# Patient Record
Sex: FEMALE | Race: WHITE | Employment: OTHER | URBAN - METROPOLITAN AREA
[De-identification: names, ages, dates, MRNs, and addresses within clinical notes are randomized per-mention and may not be internally consistent; named-entity substitution may affect disease eponyms.]

---

## 2017-04-12 DIAGNOSIS — Z00.00 ROUTINE GENERAL MEDICAL EXAMINATION AT A HEALTH CARE FACILITY: Primary | ICD-10-CM

## 2017-04-14 LAB
ANTIBODY: <0.1
BASOPHILS ABSOLUTE: NORMAL /ΜL
BASOPHILS RELATIVE PERCENT: NORMAL %
BUN BLDV-MCNC: 16 MG/DL
CALCIUM SERPL-MCNC: 9.1 MG/DL
CHLORIDE BLD-SCNC: 103 MMOL/L
CHOLESTEROL, TOTAL: 210 MG/DL
CHOLESTEROL/HDL RATIO: NORMAL
CO2: 25 MMOL/L
CREAT SERPL-MCNC: 0.72 MG/DL
EOSINOPHILS ABSOLUTE: NORMAL /ΜL
EOSINOPHILS RELATIVE PERCENT: NORMAL %
GFR CALCULATED: NORMAL
GLUCOSE BLD-MCNC: 76 MG/DL
HCT VFR BLD CALC: 40 % (ref 36–46)
HDLC SERPL-MCNC: 48 MG/DL (ref 35–70)
HEMOGLOBIN: 12.5 G/DL (ref 12–16)
LDL CHOLESTEROL CALCULATED: 142 MG/DL (ref 0–160)
LYMPHOCYTES ABSOLUTE: NORMAL /ΜL
LYMPHOCYTES RELATIVE PERCENT: NORMAL %
MCH RBC QN AUTO: NORMAL PG
MCHC RBC AUTO-ENTMCNC: NORMAL G/DL
MCV RBC AUTO: 94 FL
MONOCYTES ABSOLUTE: NORMAL /ΜL
MONOCYTES RELATIVE PERCENT: NORMAL %
NEUTROPHILS ABSOLUTE: NORMAL /ΜL
NEUTROPHILS RELATIVE PERCENT: NORMAL %
PLATELET # BLD: 209 K/ΜL
PMV BLD AUTO: NORMAL FL
POTASSIUM SERPL-SCNC: 4.1 MMOL/L
RBC # BLD: 4.27 10^6/ΜL
SODIUM BLD-SCNC: 144 MMOL/L
TRIGL SERPL-MCNC: 98 MG/DL
TSH SERPL DL<=0.05 MIU/L-ACNC: 2.07 UIU/ML
VLDLC SERPL CALC-MCNC: NORMAL MG/DL
WBC # BLD: 4.3 10^3/ML

## 2017-04-18 ENCOUNTER — OFFICE VISIT (OUTPATIENT)
Dept: INTERNAL MEDICINE CLINIC | Age: 62
End: 2017-04-18

## 2017-04-18 VITALS
WEIGHT: 163.8 LBS | HEIGHT: 65 IN | TEMPERATURE: 97.8 F | BODY MASS INDEX: 27.29 KG/M2 | DIASTOLIC BLOOD PRESSURE: 62 MMHG | HEART RATE: 60 BPM | SYSTOLIC BLOOD PRESSURE: 108 MMHG

## 2017-04-18 DIAGNOSIS — J06.9 UPPER RESPIRATORY TRACT INFECTION, UNSPECIFIED TYPE: ICD-10-CM

## 2017-04-18 DIAGNOSIS — M54.50 CHRONIC RIGHT-SIDED LOW BACK PAIN WITHOUT SCIATICA: ICD-10-CM

## 2017-04-18 DIAGNOSIS — R05.9 COUGH: ICD-10-CM

## 2017-04-18 DIAGNOSIS — Z00.00 PREVENTATIVE HEALTH CARE: Primary | ICD-10-CM

## 2017-04-18 DIAGNOSIS — R13.10 DYSPHAGIA, UNSPECIFIED TYPE: ICD-10-CM

## 2017-04-18 DIAGNOSIS — G89.29 CHRONIC RIGHT-SIDED LOW BACK PAIN WITHOUT SCIATICA: ICD-10-CM

## 2017-04-18 DIAGNOSIS — E78.00 HYPERCHOLESTEROLEMIA: ICD-10-CM

## 2017-04-18 DIAGNOSIS — E66.3 OVERWEIGHT (BMI 25.0-29.9): ICD-10-CM

## 2017-04-18 PROCEDURE — 99396 PREV VISIT EST AGE 40-64: CPT | Performed by: INTERNAL MEDICINE

## 2017-04-18 RX ORDER — AZITHROMYCIN 250 MG/1
TABLET, FILM COATED ORAL
Qty: 1 PACKET | Refills: 0 | Status: SHIPPED | OUTPATIENT
Start: 2017-04-18 | End: 2019-06-04 | Stop reason: SDUPTHER

## 2017-04-18 ASSESSMENT — ENCOUNTER SYMPTOMS
WHEEZING: 0
CHEST TIGHTNESS: 0
COUGH: 1
SHORTNESS OF BREATH: 0
ABDOMINAL PAIN: 0

## 2017-04-21 LAB
CLIENT CONTACT:: NORMAL
REFERENCE LAB: NORMAL

## 2017-09-19 ENCOUNTER — HOSPITAL ENCOUNTER (OUTPATIENT)
Dept: MAMMOGRAPHY | Age: 62
Discharge: HOME | End: 2017-09-19
Attending: OBSTETRICS & GYNECOLOGY | Admitting: OBSTETRICS & GYNECOLOGY

## 2017-09-19 DIAGNOSIS — Z12.31 ENCOUNTER FOR SCREENING MAMMOGRAM FOR MALIGNANT NEOPLASM OF BREAST: ICD-10-CM

## 2018-04-26 ENCOUNTER — OFFICE VISIT (OUTPATIENT)
Dept: INTERNAL MEDICINE CLINIC | Age: 63
End: 2018-04-26

## 2018-04-26 VITALS
HEART RATE: 70 BPM | HEIGHT: 65 IN | DIASTOLIC BLOOD PRESSURE: 66 MMHG | RESPIRATION RATE: 18 BRPM | TEMPERATURE: 98 F | WEIGHT: 166 LBS | BODY MASS INDEX: 27.66 KG/M2 | SYSTOLIC BLOOD PRESSURE: 106 MMHG | OXYGEN SATURATION: 95 %

## 2018-04-26 DIAGNOSIS — M79.644 PAIN OF RIGHT THUMB: Primary | ICD-10-CM

## 2018-04-26 PROCEDURE — 99213 OFFICE O/P EST LOW 20 MIN: CPT | Performed by: NURSE PRACTITIONER

## 2018-04-26 ASSESSMENT — PATIENT HEALTH QUESTIONNAIRE - PHQ9
SUM OF ALL RESPONSES TO PHQ9 QUESTIONS 1 & 2: 0
2. FEELING DOWN, DEPRESSED OR HOPELESS: 0
1. LITTLE INTEREST OR PLEASURE IN DOING THINGS: 0
SUM OF ALL RESPONSES TO PHQ QUESTIONS 1-9: 0

## 2018-09-27 ENCOUNTER — OFFICE VISIT (OUTPATIENT)
Dept: PRIMARY CARE CLINIC | Age: 63
End: 2018-09-27
Payer: COMMERCIAL

## 2018-09-27 VITALS
SYSTOLIC BLOOD PRESSURE: 124 MMHG | DIASTOLIC BLOOD PRESSURE: 74 MMHG | HEART RATE: 56 BPM | WEIGHT: 164.2 LBS | HEIGHT: 65 IN | BODY MASS INDEX: 27.36 KG/M2 | TEMPERATURE: 98.3 F

## 2018-09-27 DIAGNOSIS — Z00.00 ROUTINE GENERAL MEDICAL EXAMINATION AT A HEALTH CARE FACILITY: Primary | ICD-10-CM

## 2018-09-27 DIAGNOSIS — Z23 FLU VACCINE NEED: ICD-10-CM

## 2018-09-27 PROCEDURE — 90682 RIV4 VACC RECOMBINANT DNA IM: CPT | Performed by: INTERNAL MEDICINE

## 2018-09-27 PROCEDURE — 99396 PREV VISIT EST AGE 40-64: CPT | Performed by: INTERNAL MEDICINE

## 2018-09-27 PROCEDURE — 90471 IMMUNIZATION ADMIN: CPT | Performed by: INTERNAL MEDICINE

## 2018-09-27 NOTE — PROGRESS NOTES
normal.   Left Ear: External ear normal.   Mouth/Throat: Oropharynx is clear and moist. No oropharyngeal exudate. Eyes: Conjunctivae and EOM are normal. Pupils are equal, round, and reactive to light. Right eye exhibits no discharge. Left eye exhibits no discharge. No scleral icterus. Neck: Neck supple. No JVD present. No thyromegaly present. Cardiovascular:  Normal rate, regular rhythm, normal heart sounds and intact distal pulses. Exam reveals no gallop. No murmur heard. no edema. Chest: Effort normal and breath sounds normal. no wheezes. has no rales. Abdominal: Soft, Bowel sounds are normal.  exhibits no distension and no mass. No organomegaly  There is no tenderness. There is no guarding. Musculoskeletal: Normal range of motion in all extremities. Lymphadenopathy: No cervical adenopathy. Neurological:  exhibits normal muscle tone. No sensory or motor deficit, Coordination normal, normal reflexes. Skin: Skin is warm. No rash noted. No Erythema. Psychiatric:  alert and oriented to person, place, and time. Normal mood and affect. Immunization History   Administered Date(s) Administered    Influenza Virus Vaccine 10/23/2014    Influenza, Intradermal, Preservative free 01/31/2013    Tdap (Boostrix, Adacel) 05/01/2012    Zoster Live (Zostavax) 10/23/2014       Allergies   Allergen Reactions    Codeine      Itching     Current Outpatient Prescriptions   Medication Sig Dispense Refill    naproxen sodium (ANAPROX) 220 MG tablet Take 220 mg by mouth daily      GLUCOSAMINE HCL PO Take  by mouth.  azithromycin (ZITHROMAX Z-JANE) 250 MG tablet Take per directions 1 packet 0    Ascorbic Acid (VITAMIN C) 500 MG tablet Take 500 mg by mouth daily. Emergen C      CALCIUM PO Take  by mouth. No current facility-administered medications for this visit.         Past Medical History:   Diagnosis Date    Asthma     Asthma, exercise induced     Benign positional vertigo 7/25/2013    Sebaceous cyst Sebaceous Cyst via Neck     Past Surgical History:   Procedure Laterality Date    BREAST CYST EXCISION  1990    Benign cysts R breast    COLONOSCOPY  4/16    Dr. Pili Verma normal repeat 10 years    JOINT REPLACEMENT  torn rotator cuff bilaterally    KNEE ARTHROSCOPY  ~ 1999    L knee meniscus shaving    ROTATOR CUFF REPAIR  2006 (approx)    L shoulder     Family History   Problem Relation Age of Onset    Cancer Mother         Lung    Cancer Father         Lung    Cancer Brother         Prostate     Cancer Father         Throat     Social History     Social History    Marital status:      Spouse name: N/A    Number of children: N/A    Years of education: N/A     Occupational History    Not on file. Social History Main Topics    Smoking status: Former Smoker     Years: 15.00     Quit date: 5/20/1992    Smokeless tobacco: Never Used    Alcohol use Yes      Comment: Rare    Drug use: No    Sexual activity: Yes     Other Topics Concern    Not on file     Social History Narrative    No narrative on file       Assessment/Plan:    1. Routine general medical examination at a health care facility  - Basic Metabolic Panel; Future  - TSH without Reflex; Future  - Vitamin D 25 Hydroxy; Future  - Urinalysis with Microscopic; Future  - Lipid Panel;  Future  - CBC Auto Differential; Future      Preventive Care:  PAP: will get from ob- gyn  Vaccine:  Flu vaccine need  - INFLUENZA, QUADV, RECOMBINANT, 18 YRS AND OLDER, IM, PF, PREFILL SYR OR SDV, 0.5ML (FLUBLOK QUADV, PF)

## 2018-09-29 LAB
BASOPHILS ABSOLUTE: 0 /ΜL
BASOPHILS RELATIVE PERCENT: 0 %
BILIRUBIN, URINE: NEGATIVE
BLOOD, URINE: NEGATIVE
BUN BLDV-MCNC: 14 MG/DL
CALCIUM SERPL-MCNC: 9.1 MG/DL
CHLORIDE BLD-SCNC: 99 MMOL/L
CLARITY: CLEAR
CO2: 24 MMOL/L
COLOR: YELLOW
CREAT SERPL-MCNC: 0.76 MG/DL
EOSINOPHILS ABSOLUTE: 0.1 /ΜL
EOSINOPHILS RELATIVE PERCENT: 1 %
GFR CALCULATED: NORMAL
GLUCOSE BLD-MCNC: 72 MG/DL
GLUCOSE URINE: NORMAL
HCT VFR BLD CALC: 40.6 % (ref 36–46)
HEMOGLOBIN: 13.4 G/DL (ref 12–16)
KETONES, URINE: NEGATIVE
LEUKOCYTE ESTERASE, URINE: NORMAL
LYMPHOCYTES ABSOLUTE: 1.4 /ΜL
LYMPHOCYTES RELATIVE PERCENT: 31 %
MCH RBC QN AUTO: 30.9 PG
MCHC RBC AUTO-ENTMCNC: 33 G/DL
MCV RBC AUTO: 94 FL
MONOCYTES ABSOLUTE: 0.2 /ΜL
MONOCYTES RELATIVE PERCENT: 5 %
NEUTROPHILS ABSOLUTE: 2.7 /ΜL
NEUTROPHILS RELATIVE PERCENT: 63 %
NITRITE, URINE: NEGATIVE
PH UA: 6.5 (ref 4.5–8)
PLATELET # BLD: 213 K/ΜL
PMV BLD AUTO: NORMAL FL
POTASSIUM SERPL-SCNC: 4.1 MMOL/L
PROTEIN UA: NEGATIVE
RBC # BLD: 4.34 10^6/ΜL
SODIUM BLD-SCNC: 142 MMOL/L
SPECIFIC GRAVITY, URINE: 1.01
TSH SERPL DL<=0.05 MIU/L-ACNC: 1.45 UIU/ML
UROBILINOGEN, URINE: NORMAL
VITAMIN D 25-HYDROXY: 39.7
VITAMIN D2, 25 HYDROXY: NORMAL
VITAMIN D3,25 HYDROXY: NORMAL
WBC # BLD: 4.5 10^3/ML

## 2018-10-01 ENCOUNTER — HOSPITAL ENCOUNTER (OUTPATIENT)
Dept: WOMENS IMAGING | Age: 63
Discharge: HOME | End: 2018-10-01
Payer: COMMERCIAL

## 2018-10-01 DIAGNOSIS — Z12.31 ENCOUNTER FOR SCREENING MAMMOGRAM FOR MALIGNANT NEOPLASM OF BREAST: ICD-10-CM

## 2018-10-01 PROCEDURE — 77067 SCR MAMMO BI INCL CAD: CPT

## 2018-10-05 LAB
CLIENT CONTACT:: NORMAL
REFERENCE LAB: NORMAL

## 2018-10-10 LAB
CHOLESTEROL, TOTAL: 209 MG/DL
CHOLESTEROL/HDL RATIO: NORMAL
HDLC SERPL-MCNC: 48 MG/DL (ref 35–70)
LDL CHOLESTEROL CALCULATED: 140 MG/DL (ref 0–160)
TRIGL SERPL-MCNC: 105 MG/DL
VLDLC SERPL CALC-MCNC: NORMAL MG/DL

## 2019-06-04 ENCOUNTER — OFFICE VISIT (OUTPATIENT)
Dept: PRIMARY CARE CLINIC | Age: 64
End: 2019-06-04
Payer: MEDICARE

## 2019-06-04 VITALS
WEIGHT: 168 LBS | SYSTOLIC BLOOD PRESSURE: 130 MMHG | DIASTOLIC BLOOD PRESSURE: 66 MMHG | HEIGHT: 65 IN | OXYGEN SATURATION: 97 % | BODY MASS INDEX: 27.99 KG/M2 | TEMPERATURE: 98.4 F | HEART RATE: 62 BPM

## 2019-06-04 DIAGNOSIS — J20.9 ACUTE BRONCHITIS, UNSPECIFIED ORGANISM: Primary | ICD-10-CM

## 2019-06-04 PROCEDURE — 1036F TOBACCO NON-USER: CPT | Performed by: NURSE PRACTITIONER

## 2019-06-04 PROCEDURE — 3017F COLORECTAL CA SCREEN DOC REV: CPT | Performed by: NURSE PRACTITIONER

## 2019-06-04 PROCEDURE — G8427 DOCREV CUR MEDS BY ELIG CLIN: HCPCS | Performed by: NURSE PRACTITIONER

## 2019-06-04 PROCEDURE — G8419 CALC BMI OUT NRM PARAM NOF/U: HCPCS | Performed by: NURSE PRACTITIONER

## 2019-06-04 PROCEDURE — 99213 OFFICE O/P EST LOW 20 MIN: CPT | Performed by: NURSE PRACTITIONER

## 2019-06-04 RX ORDER — AZITHROMYCIN 250 MG/1
TABLET, FILM COATED ORAL
Qty: 6 TABLET | Refills: 0 | Status: SHIPPED | OUTPATIENT
Start: 2019-06-04 | End: 2019-10-24

## 2019-06-04 ASSESSMENT — PATIENT HEALTH QUESTIONNAIRE - PHQ9
1. LITTLE INTEREST OR PLEASURE IN DOING THINGS: 0
SUM OF ALL RESPONSES TO PHQ QUESTIONS 1-9: 0
SUM OF ALL RESPONSES TO PHQ QUESTIONS 1-9: 0
2. FEELING DOWN, DEPRESSED OR HOPELESS: 0
SUM OF ALL RESPONSES TO PHQ9 QUESTIONS 1 & 2: 0

## 2019-06-04 NOTE — PROGRESS NOTES
oriented to person, place, and time. Skin: Skin is warm and dry. Assessment:       Diagnosis Orders   1.  Acute bronchitis, unspecified organism             Plan:      Treat azithromycin   symbicort  rtc if no improvement, will need xray        JAKY Ramos - CNP

## 2019-06-05 ASSESSMENT — ENCOUNTER SYMPTOMS
COUGH: 1
SHORTNESS OF BREATH: 1

## 2019-10-08 ENCOUNTER — HOSPITAL ENCOUNTER (OUTPATIENT)
Dept: WOMENS IMAGING | Age: 64
Discharge: HOME | End: 2019-10-08
Payer: MEDICARE

## 2019-10-08 DIAGNOSIS — Z12.31 VISIT FOR SCREENING MAMMOGRAM: ICD-10-CM

## 2019-10-08 PROCEDURE — 77063 BREAST TOMOSYNTHESIS BI: CPT

## 2019-10-24 ENCOUNTER — OFFICE VISIT (OUTPATIENT)
Dept: PRIMARY CARE CLINIC | Age: 64
End: 2019-10-24
Payer: MEDICARE

## 2019-10-24 VITALS
TEMPERATURE: 98.1 F | HEIGHT: 65 IN | BODY MASS INDEX: 28.09 KG/M2 | DIASTOLIC BLOOD PRESSURE: 74 MMHG | SYSTOLIC BLOOD PRESSURE: 124 MMHG | HEART RATE: 60 BPM | WEIGHT: 168.6 LBS

## 2019-10-24 DIAGNOSIS — Z00.00 ENCOUNTER FOR MEDICARE ANNUAL WELLNESS EXAM: Primary | ICD-10-CM

## 2019-10-24 PROCEDURE — G0402 INITIAL PREVENTIVE EXAM: HCPCS | Performed by: INTERNAL MEDICINE

## 2019-10-24 PROCEDURE — 4040F PNEUMOC VAC/ADMIN/RCVD: CPT | Performed by: INTERNAL MEDICINE

## 2019-10-24 PROCEDURE — 1123F ACP DISCUSS/DSCN MKR DOCD: CPT | Performed by: INTERNAL MEDICINE

## 2019-10-24 PROCEDURE — 3017F COLORECTAL CA SCREEN DOC REV: CPT | Performed by: INTERNAL MEDICINE

## 2019-10-24 RX ORDER — NAPROXEN SODIUM 220 MG
220 TABLET ORAL 2 TIMES DAILY WITH MEALS
COMMUNITY
End: 2020-07-13 | Stop reason: ALTCHOICE

## 2019-10-24 ASSESSMENT — LIFESTYLE VARIABLES
AUDIT-C TOTAL SCORE: 1
HOW MANY STANDARD DRINKS CONTAINING ALCOHOL DO YOU HAVE ON A TYPICAL DAY: 0
HOW OFTEN DO YOU HAVE SIX OR MORE DRINKS ON ONE OCCASION: 0
HOW OFTEN DO YOU HAVE A DRINK CONTAINING ALCOHOL: 1

## 2019-10-24 ASSESSMENT — PATIENT HEALTH QUESTIONNAIRE - PHQ9
SUM OF ALL RESPONSES TO PHQ QUESTIONS 1-9: 0
SUM OF ALL RESPONSES TO PHQ QUESTIONS 1-9: 0

## 2020-06-25 ENCOUNTER — TELEPHONE (OUTPATIENT)
Dept: PRIMARY CARE CLINIC | Age: 65
End: 2020-06-25

## 2020-06-25 NOTE — TELEPHONE ENCOUNTER
I CALLED PT ABOUT HER APPT 7-13 WITH DR Pauline Ellis.     SHE IS REQUESTING TO HAVE HER FASTING LABS DONE PRIOR AT [address] SO THEY CAN BE DISCUSSED DURING HER VISIT    PLEASE LET HER KNOW WHAT  DECIDES

## 2020-07-01 NOTE — TELEPHONE ENCOUNTER
I called pt to let her know her orders were in her chart.   She will go to [place] one day next week

## 2020-07-07 DIAGNOSIS — E78.00 HYPERCHOLESTEROLEMIA: ICD-10-CM

## 2020-07-07 LAB
A/G RATIO: 2.1 (ref 1.1–2.2)
ALBUMIN SERPL-MCNC: 4.4 G/DL (ref 3.4–5)
ALP BLD-CCNC: 69 U/L (ref 40–129)
ALT SERPL-CCNC: 9 U/L (ref 10–40)
ANION GAP SERPL CALCULATED.3IONS-SCNC: 11 MMOL/L (ref 3–16)
AST SERPL-CCNC: 14 U/L (ref 15–37)
BASOPHILS ABSOLUTE: 0 K/UL (ref 0–0.2)
BASOPHILS RELATIVE PERCENT: 0.8 %
BILIRUB SERPL-MCNC: 0.3 MG/DL (ref 0–1)
BUN BLDV-MCNC: 11 MG/DL (ref 7–20)
CALCIUM SERPL-MCNC: 8.9 MG/DL (ref 8.3–10.6)
CHLORIDE BLD-SCNC: 106 MMOL/L (ref 99–110)
CHOLESTEROL, TOTAL: 204 MG/DL (ref 0–199)
CO2: 26 MMOL/L (ref 21–32)
CREAT SERPL-MCNC: 0.6 MG/DL (ref 0.6–1.2)
EOSINOPHILS ABSOLUTE: 0.1 K/UL (ref 0–0.6)
EOSINOPHILS RELATIVE PERCENT: 2.6 %
GFR AFRICAN AMERICAN: >60
GFR NON-AFRICAN AMERICAN: >60
GLOBULIN: 2.1 G/DL
GLUCOSE BLD-MCNC: 85 MG/DL (ref 70–99)
HCT VFR BLD CALC: 40.6 % (ref 36–48)
HDLC SERPL-MCNC: 44 MG/DL (ref 40–60)
HEMOGLOBIN: 13.5 G/DL (ref 12–16)
LDL CHOLESTEROL CALCULATED: 135 MG/DL
LYMPHOCYTES ABSOLUTE: 1.3 K/UL (ref 1–5.1)
LYMPHOCYTES RELATIVE PERCENT: 27.2 %
MCH RBC QN AUTO: 31.4 PG (ref 26–34)
MCHC RBC AUTO-ENTMCNC: 33.3 G/DL (ref 31–36)
MCV RBC AUTO: 94 FL (ref 80–100)
MONOCYTES ABSOLUTE: 0.3 K/UL (ref 0–1.3)
MONOCYTES RELATIVE PERCENT: 5.9 %
NEUTROPHILS ABSOLUTE: 2.9 K/UL (ref 1.7–7.7)
NEUTROPHILS RELATIVE PERCENT: 63.5 %
PDW BLD-RTO: 13.4 % (ref 12.4–15.4)
PLATELET # BLD: 166 K/UL (ref 135–450)
PMV BLD AUTO: 9.6 FL (ref 5–10.5)
POTASSIUM SERPL-SCNC: 4.3 MMOL/L (ref 3.5–5.1)
RBC # BLD: 4.31 M/UL (ref 4–5.2)
SODIUM BLD-SCNC: 143 MMOL/L (ref 136–145)
TOTAL PROTEIN: 6.5 G/DL (ref 6.4–8.2)
TRIGL SERPL-MCNC: 124 MG/DL (ref 0–150)
VLDLC SERPL CALC-MCNC: 25 MG/DL
WBC # BLD: 4.6 K/UL (ref 4–11)

## 2020-07-13 ENCOUNTER — OFFICE VISIT (OUTPATIENT)
Dept: PRIMARY CARE CLINIC | Age: 65
End: 2020-07-13
Payer: MEDICARE

## 2020-07-13 VITALS
BODY MASS INDEX: 27.32 KG/M2 | DIASTOLIC BLOOD PRESSURE: 68 MMHG | TEMPERATURE: 98.2 F | OXYGEN SATURATION: 97 % | HEIGHT: 66 IN | WEIGHT: 170 LBS | SYSTOLIC BLOOD PRESSURE: 110 MMHG | HEART RATE: 67 BPM

## 2020-07-13 PROBLEM — R13.10 DYSPHAGIA: Status: RESOLVED | Noted: 2017-04-18 | Resolved: 2020-07-13

## 2020-07-13 PROCEDURE — 99214 OFFICE O/P EST MOD 30 MIN: CPT | Performed by: FAMILY MEDICINE

## 2020-07-13 RX ORDER — GABAPENTIN 100 MG/1
100 CAPSULE ORAL NIGHTLY
Qty: 90 CAPSULE | Refills: 0 | Status: SHIPPED | OUTPATIENT
Start: 2020-07-13 | End: 2021-07-15 | Stop reason: ALTCHOICE

## 2020-07-13 ASSESSMENT — ENCOUNTER SYMPTOMS
SHORTNESS OF BREATH: 0
COUGH: 0
COLOR CHANGE: 1
ABDOMINAL PAIN: 0

## 2020-07-13 ASSESSMENT — PATIENT HEALTH QUESTIONNAIRE - PHQ9
SUM OF ALL RESPONSES TO PHQ QUESTIONS 1-9: 0
1. LITTLE INTEREST OR PLEASURE IN DOING THINGS: 0
SUM OF ALL RESPONSES TO PHQ9 QUESTIONS 1 & 2: 0
SUM OF ALL RESPONSES TO PHQ QUESTIONS 1-9: 0
2. FEELING DOWN, DEPRESSED OR HOPELESS: 0

## 2020-07-13 NOTE — PROGRESS NOTES
cysts R breast    COLONOSCOPY  4/16    Dr. Ac Salinas normal repeat 10 years    KNEE ARTHROSCOPY  ~ 1999    L knee meniscus shaving    ROTATOR CUFF REPAIR  2006 (approx)    Both shoulders        Allergies   Allergen Reactions    Codeine Itching     Itching        Family History   Problem Relation Age of Onset    Cancer Mother         Lung    Cancer Father         Lung/Throat    Cancer Brother         Prostate     Other Brother         lymphoma    Kidney Disease Sister         Patient's past medical history, surgical history, family history, medications, and allergies  were all reviewed and updated as appropriate today. Review of Systems   Constitutional: Negative for activity change, fever and unexpected weight change. Respiratory: Negative for cough and shortness of breath. Cardiovascular: Negative for chest pain. Gastrointestinal: Negative for abdominal pain. Musculoskeletal: Positive for arthralgias and joint swelling. Negative for gait problem. Skin: Positive for color change. Neurological: Negative for dizziness and weakness. Psychiatric/Behavioral: Positive for sleep disturbance. Negative for dysphoric mood. /68   Pulse 67   Temp 98.2 °F (36.8 °C) (Oral)   Ht 5' 6\" (1.676 m)   Wt 170 lb (77.1 kg)   SpO2 97%   BMI 27.44 kg/m²      Physical Exam  Vitals signs reviewed. Constitutional:       General: She is not in acute distress. Appearance: Normal appearance. She is well-developed. HENT:      Head: Normocephalic and atraumatic. Nose: Nose normal.      Mouth/Throat:      Mouth: Mucous membranes are moist.   Eyes:      General: No scleral icterus. Conjunctiva/sclera: Conjunctivae normal.      Pupils: Pupils are equal, round, and reactive to light. Neck:      Musculoskeletal: Neck supple. Thyroid: No thyromegaly. Cardiovascular:      Rate and Rhythm: Normal rate and regular rhythm. Heart sounds: No murmur.    Pulmonary:      Effort: Pulmonary diet. Monitor yearly. Return in about 1 year (around 7/13/2021) for Annual check.           [address], DO

## 2020-07-16 DIAGNOSIS — M25.50 ARTHRALGIA, UNSPECIFIED JOINT: ICD-10-CM

## 2020-07-16 DIAGNOSIS — R20.2 DISTAL PARESTHESIA: ICD-10-CM

## 2020-07-16 LAB
FOLATE: 11.04 NG/ML (ref 4.78–24.2)
RHEUMATOID FACTOR: <10 IU/ML
SEDIMENTATION RATE, ERYTHROCYTE: 10 MM/HR (ref 0–30)
VITAMIN B-12: 265 PG/ML (ref 211–911)

## 2020-07-17 LAB — ANTI-NUCLEAR ANTIBODY (ANA): NEGATIVE

## 2020-12-18 ENCOUNTER — HOSPITAL ENCOUNTER (OUTPATIENT)
Dept: WOMENS IMAGING | Age: 65
Discharge: HOME | End: 2020-12-18
Payer: MEDICARE

## 2020-12-18 PROCEDURE — 77063 BREAST TOMOSYNTHESIS BI: CPT

## 2021-07-01 ENCOUNTER — TELEPHONE (OUTPATIENT)
Dept: PRIMARY CARE CLINIC | Age: 66
End: 2021-07-01

## 2021-07-01 NOTE — TELEPHONE ENCOUNTER
Denise Pelayo, can you look at this visit please. Patient is scheduled as new, if it is for her yearly visit is should be a medicare wellness visit. Also we cannot give her a \"whooping cough vaccine\" if she wants the tetanus shot with whooping cough in it she will need to get it at a pharmacy. Medicare does not pay the doctor for these shots.

## 2021-07-01 NOTE — TELEPHONE ENCOUNTER
----- Message from Bebo Yanez sent at 7/1/2021  2:12 PM EDT -----  Subject: Message to Provider    QUESTIONS  Information for Provider? patient received a call yesterday and scheduled   appt, she sees in my chart that it is for a new patient but if its just   routine visit she does not want the appt. Would like the office to call   her and confirm why the office scheduled a new patient appt   ---------------------------------------------------------------------------  --------------  CALL BACK INFO  What is the best way for the office to contact you? OK to leave message on   voicemail  Preferred Call Back Phone Number? [phone]  ---------------------------------------------------------------------------  --------------  SCRIPT ANSWERS  Relationship to Patient?  Self

## 2021-07-12 ASSESSMENT — LIFESTYLE VARIABLES
HOW OFTEN DURING THE LAST YEAR HAVE YOU NEEDED AN ALCOHOLIC DRINK FIRST THING IN THE MORNING TO GET YOURSELF GOING AFTER A NIGHT OF HEAVY DRINKING: 0
HAVE YOU OR SOMEONE ELSE BEEN INJURED AS A RESULT OF YOUR DRINKING: 0
HOW OFTEN DURING THE LAST YEAR HAVE YOU FAILED TO DO WHAT WAS NORMALLY EXPECTED FROM YOU BECAUSE OF DRINKING: NEVER
HOW OFTEN DURING THE LAST YEAR HAVE YOU NEEDED AN ALCOHOLIC DRINK FIRST THING IN THE MORNING TO GET YOURSELF GOING AFTER A NIGHT OF HEAVY DRINKING: NEVER
HOW MANY STANDARD DRINKS CONTAINING ALCOHOL DO YOU HAVE ON A TYPICAL DAY: ONE OR TWO
HOW OFTEN DURING THE LAST YEAR HAVE YOU FAILED TO DO WHAT WAS NORMALLY EXPECTED FROM YOU BECAUSE OF DRINKING: 0
HOW OFTEN DO YOU HAVE SIX OR MORE DRINKS ON ONE OCCASION: 0
HOW OFTEN DO YOU HAVE A DRINK CONTAINING ALCOHOL: 1
HOW OFTEN DO YOU HAVE SIX OR MORE DRINKS ON ONE OCCASION: NEVER
HOW OFTEN DURING THE LAST YEAR HAVE YOU HAD A FEELING OF GUILT OR REMORSE AFTER DRINKING: 0
HAS A RELATIVE, FRIEND, DOCTOR, OR ANOTHER HEALTH PROFESSIONAL EXPRESSED CONCERN ABOUT YOUR DRINKING OR SUGGESTED YOU CUT DOWN: NO
HOW OFTEN DURING THE LAST YEAR HAVE YOU BEEN UNABLE TO REMEMBER WHAT HAPPENED THE NIGHT BEFORE BECAUSE YOU HAD BEEN DRINKING: 0
HOW OFTEN DO YOU HAVE A DRINK CONTAINING ALCOHOL: MONTHLY OR LESS
HAVE YOU OR SOMEONE ELSE BEEN INJURED AS A RESULT OF YOUR DRINKING: NO
AUDIT-C TOTAL SCORE: 0
HAS A RELATIVE, FRIEND, DOCTOR, OR ANOTHER HEALTH PROFESSIONAL EXPRESSED CONCERN ABOUT YOUR DRINKING OR SUGGESTED YOU CUT DOWN: 0
HOW OFTEN DURING THE LAST YEAR HAVE YOU FOUND THAT YOU WERE NOT ABLE TO STOP DRINKING ONCE YOU HAD STARTED: NEVER
HOW MANY STANDARD DRINKS CONTAINING ALCOHOL DO YOU HAVE ON A TYPICAL DAY: 0
HOW OFTEN DURING THE LAST YEAR HAVE YOU FOUND THAT YOU WERE NOT ABLE TO STOP DRINKING ONCE YOU HAD STARTED: 0
HOW OFTEN DURING THE LAST YEAR HAVE YOU HAD A FEELING OF GUILT OR REMORSE AFTER DRINKING: NEVER
HOW OFTEN DURING THE LAST YEAR HAVE YOU BEEN UNABLE TO REMEMBER WHAT HAPPENED THE NIGHT BEFORE BECAUSE YOU HAD BEEN DRINKING: NEVER
AUDIT TOTAL SCORE: 0
AUDIT-C TOTAL SCORE: 1
AUDIT TOTAL SCORE: 1

## 2021-07-12 ASSESSMENT — PATIENT HEALTH QUESTIONNAIRE - PHQ9
2. FEELING DOWN, DEPRESSED OR HOPELESS: 0
SUM OF ALL RESPONSES TO PHQ QUESTIONS 1-9: 0
SUM OF ALL RESPONSES TO PHQ QUESTIONS 1-9: 0
SUM OF ALL RESPONSES TO PHQ9 QUESTIONS 1 & 2: 0
1. LITTLE INTEREST OR PLEASURE IN DOING THINGS: 0
SUM OF ALL RESPONSES TO PHQ QUESTIONS 1-9: 0

## 2021-07-15 ENCOUNTER — OFFICE VISIT (OUTPATIENT)
Dept: PRIMARY CARE CLINIC | Age: 66
End: 2021-07-15
Payer: MEDICARE

## 2021-07-15 ENCOUNTER — HOSPITAL ENCOUNTER (OUTPATIENT)
Dept: GENERAL RADIOLOGY | Age: 66
Discharge: HOME | End: 2021-07-15
Payer: MEDICARE

## 2021-07-15 ENCOUNTER — HOSPITAL ENCOUNTER (OUTPATIENT)
Age: 66
Discharge: HOME | End: 2021-07-15
Payer: MEDICARE

## 2021-07-15 VITALS
SYSTOLIC BLOOD PRESSURE: 100 MMHG | HEART RATE: 57 BPM | TEMPERATURE: 98.1 F | BODY MASS INDEX: 28.06 KG/M2 | HEIGHT: 65 IN | OXYGEN SATURATION: 100 % | WEIGHT: 168.4 LBS | DIASTOLIC BLOOD PRESSURE: 62 MMHG

## 2021-07-15 DIAGNOSIS — M25.50 POLYARTHRALGIA: ICD-10-CM

## 2021-07-15 DIAGNOSIS — Z00.00 ROUTINE GENERAL MEDICAL EXAMINATION AT A HEALTH CARE FACILITY: Primary | ICD-10-CM

## 2021-07-15 DIAGNOSIS — R68.89 HEAT INTOLERANCE: ICD-10-CM

## 2021-07-15 DIAGNOSIS — E78.00 HYPERCHOLESTEROLEMIA: ICD-10-CM

## 2021-07-15 DIAGNOSIS — M54.2 NECK PAIN: ICD-10-CM

## 2021-07-15 DIAGNOSIS — Z78.0 POST-MENOPAUSAL: ICD-10-CM

## 2021-07-15 DIAGNOSIS — E53.8 B12 DEFICIENCY: ICD-10-CM

## 2021-07-15 LAB
A/G RATIO: 1.9 (ref 1.1–2.2)
ALBUMIN SERPL-MCNC: 4.4 G/DL (ref 3.4–5)
ALP BLD-CCNC: 76 U/L (ref 40–129)
ALT SERPL-CCNC: 11 U/L (ref 10–40)
ANION GAP SERPL CALCULATED.3IONS-SCNC: 11 MMOL/L (ref 3–16)
AST SERPL-CCNC: 13 U/L (ref 15–37)
BASOPHILS ABSOLUTE: 0 K/UL (ref 0–0.2)
BASOPHILS RELATIVE PERCENT: 0.9 %
BILIRUB SERPL-MCNC: 0.6 MG/DL (ref 0–1)
BUN BLDV-MCNC: 13 MG/DL (ref 7–20)
CALCIUM SERPL-MCNC: 9.1 MG/DL (ref 8.3–10.6)
CHLORIDE BLD-SCNC: 103 MMOL/L (ref 99–110)
CHOLESTEROL, TOTAL: 230 MG/DL (ref 0–199)
CO2: 28 MMOL/L (ref 21–32)
CREAT SERPL-MCNC: 0.7 MG/DL (ref 0.6–1.2)
EOSINOPHILS ABSOLUTE: 0.1 K/UL (ref 0–0.6)
EOSINOPHILS RELATIVE PERCENT: 1.5 %
FOLATE: 9.62 NG/ML (ref 4.78–24.2)
GFR AFRICAN AMERICAN: >60
GFR NON-AFRICAN AMERICAN: >60
GLOBULIN: 2.3 G/DL
GLUCOSE BLD-MCNC: 78 MG/DL (ref 70–99)
HCT VFR BLD CALC: 39.2 % (ref 36–48)
HDLC SERPL-MCNC: 45 MG/DL (ref 40–60)
HEMOGLOBIN: 13.4 G/DL (ref 12–16)
LDL CHOLESTEROL CALCULATED: 166 MG/DL
LYMPHOCYTES ABSOLUTE: 1.2 K/UL (ref 1–5.1)
LYMPHOCYTES RELATIVE PERCENT: 26.2 %
MCH RBC QN AUTO: 32 PG (ref 26–34)
MCHC RBC AUTO-ENTMCNC: 34.1 G/DL (ref 31–36)
MCV RBC AUTO: 93.8 FL (ref 80–100)
MONOCYTES ABSOLUTE: 0.3 K/UL (ref 0–1.3)
MONOCYTES RELATIVE PERCENT: 5.8 %
NEUTROPHILS ABSOLUTE: 3 K/UL (ref 1.7–7.7)
NEUTROPHILS RELATIVE PERCENT: 65.6 %
PDW BLD-RTO: 13.6 % (ref 12.4–15.4)
PLATELET # BLD: 181 K/UL (ref 135–450)
PMV BLD AUTO: 9.5 FL (ref 5–10.5)
POTASSIUM SERPL-SCNC: 4.5 MMOL/L (ref 3.5–5.1)
RBC # BLD: 4.18 M/UL (ref 4–5.2)
SODIUM BLD-SCNC: 142 MMOL/L (ref 136–145)
TOTAL PROTEIN: 6.7 G/DL (ref 6.4–8.2)
TRIGL SERPL-MCNC: 97 MG/DL (ref 0–150)
TSH REFLEX: 1.42 UIU/ML (ref 0.27–4.2)
VITAMIN B-12: 697 PG/ML (ref 211–911)
VITAMIN D 25-HYDROXY: 35.7 NG/ML
VLDLC SERPL CALC-MCNC: 19 MG/DL
WBC # BLD: 4.6 K/UL (ref 4–11)

## 2021-07-15 PROCEDURE — G8417 CALC BMI ABV UP PARAM F/U: HCPCS | Performed by: FAMILY MEDICINE

## 2021-07-15 PROCEDURE — 72040 X-RAY EXAM NECK SPINE 2-3 VW: CPT

## 2021-07-15 PROCEDURE — 1123F ACP DISCUSS/DSCN MKR DOCD: CPT | Performed by: FAMILY MEDICINE

## 2021-07-15 PROCEDURE — G8400 PT W/DXA NO RESULTS DOC: HCPCS | Performed by: FAMILY MEDICINE

## 2021-07-15 PROCEDURE — G8427 DOCREV CUR MEDS BY ELIG CLIN: HCPCS | Performed by: FAMILY MEDICINE

## 2021-07-15 PROCEDURE — 3017F COLORECTAL CA SCREEN DOC REV: CPT | Performed by: FAMILY MEDICINE

## 2021-07-15 PROCEDURE — 1090F PRES/ABSN URINE INCON ASSESS: CPT | Performed by: FAMILY MEDICINE

## 2021-07-15 PROCEDURE — 4040F PNEUMOC VAC/ADMIN/RCVD: CPT | Performed by: FAMILY MEDICINE

## 2021-07-15 PROCEDURE — G0438 PPPS, INITIAL VISIT: HCPCS | Performed by: FAMILY MEDICINE

## 2021-07-15 PROCEDURE — 99213 OFFICE O/P EST LOW 20 MIN: CPT | Performed by: FAMILY MEDICINE

## 2021-07-15 PROCEDURE — 1036F TOBACCO NON-USER: CPT | Performed by: FAMILY MEDICINE

## 2021-07-15 RX ORDER — LANOLIN ALCOHOL/MO/W.PET/CERES
1000 CREAM (GRAM) TOPICAL DAILY
COMMUNITY
End: 2022-08-18 | Stop reason: CLARIF

## 2021-07-15 NOTE — PATIENT INSTRUCTIONS
Personalized Preventive Plan for Perez Kill - 7/15/2021  Medicare offers a range of preventive health benefits. Some of the tests and screenings are paid in full while other may be subject to a deductible, co-insurance, and/or copay. Some of these benefits include a comprehensive review of your medical history including lifestyle, illnesses that may run in your family, and various assessments and screenings as appropriate. After reviewing your medical record and screening and assessments performed today your provider may have ordered immunizations, labs, imaging, and/or referrals for you. A list of these orders (if applicable) as well as your Preventive Care list are included within your After Visit Summary for your review. Other Preventive Recommendations:    · A preventive eye exam performed by an eye specialist is recommended every 1-2 years to screen for glaucoma; cataracts, macular degeneration, and other eye disorders. · A preventive dental visit is recommended every 6 months. · Try to get at least 150 minutes of exercise per week or 10,000 steps per day on a pedometer . · Order or download the FREE \"Exercise & Physical Activity: Your Everyday Guide\" from The [employer]. Call 2-143.661.2608 or search [employer] online. · You need 4997-2300 mg of calcium and 5182-1751 IU of vitamin D per day. It is possible to meet your calcium requirement with diet alone, but a vitamin D supplement is usually necessary to meet this goal.  · When exposed to the sun, use a sunscreen that protects against both UVA and UVB radiation with an SPF of 30 or greater. Reapply every 2 to 3 hours or after sweating, drying off with a towel, or swimming. · Always wear a seat belt when traveling in a car. Always wear a helmet when riding a bicycle or motorcycle.

## 2021-07-15 NOTE — PROGRESS NOTES
Medicare Annual Wellness Visit  Name: Brian Collins Date: 2021   MRN: <[id number]> Sex: Female   Age: 78 y.o. Ethnicity: Non-/Non    : 1955 Race: Chato Hicks is here for Annual Exam (fasting)    Screenings for behavioral, psychosocial and functional/safety risks, and cognitive dysfunction are all negative except as indicated below. These results, as well as other patient data from the [address] form, are documented in Flowsheets linked to this Encounter. Patient seen at last visit with c/o polyarthralgia. Inflammatory labs ordered. All normal. Hands, knee, hip, shoulders painful. Now c/o neck pain. Denies any injury. States pain radiates to both UE. Left trapezius area also painful. H/o MRI >15 years ago. Was given gabapentin at last visit, but stopped it. B12 <300 at last visit. H/o deficiency. On oral supplement daily. Cholesterol mildly elevated . Not on any treatment. Patient also c/o flushing of the face. Feels like her face will have waves of feeling very overheated, unable to cool down. Worse after working outside. Feels different than prior hot flashes. Feels particularly sensitive to the heat compared to prior. Allergies   Allergen Reactions    Codeine Itching     Itching         Prior to Visit Medications    Medication Sig Taking?  Authorizing Provider   vitamin B-12 (CYANOCOBALAMIN) 1000 MCG tablet Take 1,000 mcg by mouth daily Yes Historical Provider, MD         Past Medical History:   Diagnosis Date    Asthma, exercise induced     Benign positional vertigo 2013       Past Surgical History:   Procedure Laterality Date    BREAST CYST EXCISION      Benign cysts R breast    COLONOSCOPY      Dr. Bhandari Dys normal repeat 10 years    KNEE ARTHROSCOPY  ~     L knee meniscus shaving    ROTATOR CUFF REPAIR  2006 (approx)    Both shoulders         Family History   Problem Relation Age of Onset    Cancer Mother Lung    Cancer Father         Lung/Throat    Cancer Brother         Prostate     Other Brother         lymphoma    Kidney Disease Sister        Chiqui (Including outside providers/suppliers regularly involved in providing care):   Patient Care Team:  Ramin Lantigua DO as PCP - General (Family Medicine)  Darryle So, MD as PCP - OBGYN (Obstetrics & Gynecology)  Ramin Lantigua DO as PCP - [facility] Empaneled Provider    Wt Readings from Last 3 Encounters:   07/15/21 168 lb 6.4 oz (76.4 kg)   07/13/20 170 lb (77.1 kg)   10/24/19 168 lb 9.6 oz (76.5 kg)     Vitals:    07/15/21 0951   BP: 100/62   Cuff Size: Medium Adult   Pulse: 57   Temp: 98.1 °F (36.7 °C)   TempSrc: Temporal   SpO2: 100%   Weight: 168 lb 6.4 oz (76.4 kg)   Height: 5' 5\" (1.651 m)     Body mass index is 28.02 kg/m². Based upon direct observation of the patient, evaluation of cognition reveals recent and remote memory intact. GEN: Calm, NAD  HEENT: PERRL  CV: RRR, no murmur  Resp: CTABL  Abd: Soft, NTTP, non-distended  Ext: All 4 ext 5/5 strength, no C/C/E  Musc: + TTP left trapezius, negative spurlings, normal ROM but painful, pain noted with strength testing BL UE  Neuro: NFD    Patient's complete Health Risk Assessment and screening values have been reviewed and are found in Flowsheets. The following problems were reviewed today and where indicated follow up appointments were made and/or referrals ordered.     Positive Risk Factor Screenings with Interventions:            Hearing/Vision:  No exam data present  Hearing/Vision  Do you or your family notice any trouble with your hearing that hasn't been managed with hearing aids?: (!) Yes  Do you have difficulty driving, watching TV, or doing any of your daily activities because of your eyesight?: No  Have you had an eye exam within the past year?: Yes  ·     Safety:  Safety  Do you have working smoke detectors?: Yes  Have all throw rugs been removed or fastened?: (!) No  Do you have non-slip mats or surfaces in all bathtubs/showers?: Yes  Do all of your stairways have a railing or banister?: Yes  Are your doorways, halls and stairs free of clutter?: Yes  Do you always fasten your seatbelt when you are in a car?: Yes  Safety Interventions:  · Home safety tips provided     Personalized Preventive Plan   Current Health Maintenance Status  Immunization History   Administered Date(s) Administered    COVID-19, Sher Peter, PF, 30mcg/0.3mL 02/11/2021, 03/02/2021    Influenza Virus Vaccine 10/23/2014    Influenza, Intradermal, Preservative free 01/31/2013    Influenza, Quadv, Recombinant, IM PF (Flublok 18 yrs and older) 09/27/2018    Tdap (Boostrix, Adacel) 05/01/2012    Zoster Live (Zostavax) 10/23/2014        Health Maintenance   Topic Date Due    DEXA (modify frequency per FRAX score)  Never done    Shingles Vaccine (2 of 3) 12/18/2014    Annual Wellness Visit (AWV)  Never done    Pneumococcal 65+ years Vaccine (1 of 1 - PPSV23) Never done    Flu vaccine (1) 09/01/2021    DTaP/Tdap/Td vaccine (2 - Td or Tdap) 05/01/2022    Breast cancer screen  12/18/2022    Colon cancer screen colonoscopy  04/05/2026    Lipid screen  07/15/2026    COVID-19 Vaccine  Completed    Hepatitis C screen  Completed    Hepatitis A vaccine  Aged Out    Hepatitis B vaccine  Aged Out    Hib vaccine  Aged Out    Meningococcal (ACWY) vaccine  Aged Out     Recommendations for [employer] Due: see orders and patient instructions/AVS.  . Recommended screening schedule for the next 5-10 years is provided to the patient in written form: see Patient Nikky Sheth was seen today for annual exam.    Diagnoses and all orders for this visit:    Routine general medical examination at a health care facility    B12 deficiency  -     VITAMIN B12 & FOLATE; Future  -     CBC WITH AUTO DIFFERENTIAL;  Future    Polyarthralgia  -     VITAMIN D 25 HYDROXY; Future    Hypercholesterolemia  -     LIPID PANEL; Future  -     COMPREHENSIVE METABOLIC PANEL; Future    Heat intolerance  -     TSH with Reflex; Future    Neck pain  -     XR CERVICAL SPINE (2-3 VIEWS); Future    Post-menopausal  -     DEXA BONE DENSITY 2 SITES;  Future  -     VITAMIN D 25 HYDROXY; Future

## 2021-07-16 ENCOUNTER — TELEPHONE (OUTPATIENT)
Dept: PRIMARY CARE CLINIC | Age: 66
End: 2021-07-16

## 2021-07-16 DIAGNOSIS — M50.30 DEGENERATIVE DISC DISEASE, CERVICAL: Primary | ICD-10-CM

## 2021-07-16 NOTE — TELEPHONE ENCOUNTER
Please let patient know labs stable with the exception of cholesterol which is higher than last year. Would like for her to watch her cholesterol intake and follow AHA recommendations for heart healthy diet. Otherwise, XRAY of neck was abnormal. Significant arthritis demonstrated. With the weakness/pain in her arms, I would like to proceed with scheduling an MRI of her cervical spine. This has been ordered.

## 2021-08-03 ENCOUNTER — HOSPITAL ENCOUNTER (OUTPATIENT)
Dept: WOMENS IMAGING | Age: 66
Discharge: HOME | End: 2021-08-03
Payer: MEDICARE

## 2021-08-03 ENCOUNTER — HOSPITAL ENCOUNTER (OUTPATIENT)
Dept: MRI IMAGING | Age: 66
Discharge: HOME | End: 2021-08-03
Payer: MEDICARE

## 2021-08-03 DIAGNOSIS — Z78.0 POST-MENOPAUSAL: ICD-10-CM

## 2021-08-03 DIAGNOSIS — M50.30 DEGENERATIVE DISC DISEASE, CERVICAL: ICD-10-CM

## 2021-08-03 PROCEDURE — 72141 MRI NECK SPINE W/O DYE: CPT

## 2021-08-03 PROCEDURE — 77080 DXA BONE DENSITY AXIAL: CPT

## 2021-08-04 ENCOUNTER — TELEPHONE (OUTPATIENT)
Dept: PRIMARY CARE CLINIC | Age: 66
End: 2021-08-04

## 2021-08-06 DIAGNOSIS — M81.0 AGE-RELATED OSTEOPOROSIS WITHOUT CURRENT PATHOLOGICAL FRACTURE: ICD-10-CM

## 2021-08-06 DIAGNOSIS — M48.02 STENOSIS OF CERVICAL SPINE: Primary | ICD-10-CM

## 2021-08-06 RX ORDER — ALENDRONATE SODIUM 70 MG/1
70 TABLET ORAL
Qty: 12 TABLET | Refills: 1 | Status: SHIPPED | OUTPATIENT
Start: 2021-08-06 | End: 2022-01-13

## 2021-12-21 ENCOUNTER — HOSPITAL ENCOUNTER (OUTPATIENT)
Dept: WOMENS IMAGING | Age: 66
Discharge: HOME | End: 2021-12-21
Payer: MEDICARE

## 2021-12-21 VITALS — HEIGHT: 65 IN | WEIGHT: 168 LBS | BODY MASS INDEX: 27.99 KG/M2

## 2021-12-21 DIAGNOSIS — Z12.31 ENCOUNTER FOR SCREENING MAMMOGRAM FOR BREAST CANCER: ICD-10-CM

## 2021-12-21 PROCEDURE — 77063 BREAST TOMOSYNTHESIS BI: CPT

## 2022-01-13 DIAGNOSIS — M81.0 AGE-RELATED OSTEOPOROSIS WITHOUT CURRENT PATHOLOGICAL FRACTURE: ICD-10-CM

## 2022-01-13 RX ORDER — ALENDRONATE SODIUM 70 MG/1
TABLET ORAL
Qty: 12 TABLET | Refills: 1 | Status: SHIPPED | OUTPATIENT
Start: 2022-01-13 | End: 2022-06-27

## 2022-01-13 NOTE — TELEPHONE ENCOUNTER
Recent Visits  Date Type Provider Dept   07/15/21 Office Visit [address], DO [facility] Sheri Cook   Showing recent visits within past 540 days with a meds authorizing provider and meeting all other requirements  Future Appointments  No visits were found meeting these conditions.   Showing future appointments within next 150 days with a meds authorizing provider and meeting all other requirements

## 2022-06-27 DIAGNOSIS — M81.0 AGE-RELATED OSTEOPOROSIS WITHOUT CURRENT PATHOLOGICAL FRACTURE: ICD-10-CM

## 2022-06-28 RX ORDER — ALENDRONATE SODIUM 70 MG/1
TABLET ORAL
Qty: 12 TABLET | Refills: 0 | Status: SHIPPED | OUTPATIENT
Start: 2022-06-28 | End: 2022-09-15

## 2022-08-18 ENCOUNTER — OFFICE VISIT (OUTPATIENT)
Dept: PRIMARY CARE CLINIC | Age: 67
End: 2022-08-18
Payer: MEDICARE

## 2022-08-18 VITALS
SYSTOLIC BLOOD PRESSURE: 110 MMHG | DIASTOLIC BLOOD PRESSURE: 67 MMHG | OXYGEN SATURATION: 97 % | BODY MASS INDEX: 28.42 KG/M2 | WEIGHT: 170.6 LBS | HEIGHT: 65 IN | TEMPERATURE: 97.6 F | HEART RATE: 58 BPM

## 2022-08-18 DIAGNOSIS — R53.83 MALAISE AND FATIGUE: ICD-10-CM

## 2022-08-18 DIAGNOSIS — R53.81 MALAISE AND FATIGUE: ICD-10-CM

## 2022-08-18 DIAGNOSIS — Z00.00 MEDICARE ANNUAL WELLNESS VISIT, SUBSEQUENT: Primary | ICD-10-CM

## 2022-08-18 DIAGNOSIS — E78.00 HYPERCHOLESTEROLEMIA: ICD-10-CM

## 2022-08-18 DIAGNOSIS — H91.90 HEARING LOSS, UNSPECIFIED HEARING LOSS TYPE, UNSPECIFIED LATERALITY: ICD-10-CM

## 2022-08-18 DIAGNOSIS — E53.8 B12 DEFICIENCY: ICD-10-CM

## 2022-08-18 PROBLEM — M48.02 SPINAL STENOSIS OF CERVICAL REGION: Status: ACTIVE | Noted: 2021-11-22

## 2022-08-18 PROCEDURE — 1123F ACP DISCUSS/DSCN MKR DOCD: CPT | Performed by: FAMILY MEDICINE

## 2022-08-18 PROCEDURE — 3017F COLORECTAL CA SCREEN DOC REV: CPT | Performed by: FAMILY MEDICINE

## 2022-08-18 PROCEDURE — G0439 PPPS, SUBSEQ VISIT: HCPCS | Performed by: FAMILY MEDICINE

## 2022-08-18 SDOH — HEALTH STABILITY: PHYSICAL HEALTH: ON AVERAGE, HOW MANY DAYS PER WEEK DO YOU ENGAGE IN MODERATE TO STRENUOUS EXERCISE (LIKE A BRISK WALK)?: 4 DAYS

## 2022-08-18 SDOH — ECONOMIC STABILITY: FOOD INSECURITY: WITHIN THE PAST 12 MONTHS, YOU WORRIED THAT YOUR FOOD WOULD RUN OUT BEFORE YOU GOT MONEY TO BUY MORE.: NEVER TRUE

## 2022-08-18 SDOH — HEALTH STABILITY: PHYSICAL HEALTH: ON AVERAGE, HOW MANY MINUTES DO YOU ENGAGE IN EXERCISE AT THIS LEVEL?: 40 MIN

## 2022-08-18 SDOH — ECONOMIC STABILITY: FOOD INSECURITY: WITHIN THE PAST 12 MONTHS, THE FOOD YOU BOUGHT JUST DIDN'T LAST AND YOU DIDN'T HAVE MONEY TO GET MORE.: NEVER TRUE

## 2022-08-18 ASSESSMENT — PATIENT HEALTH QUESTIONNAIRE - PHQ9
SUM OF ALL RESPONSES TO PHQ QUESTIONS 1-9: 0
2. FEELING DOWN, DEPRESSED OR HOPELESS: 0
SUM OF ALL RESPONSES TO PHQ QUESTIONS 1-9: 0
SUM OF ALL RESPONSES TO PHQ QUESTIONS 1-9: 0
1. LITTLE INTEREST OR PLEASURE IN DOING THINGS: 0
SUM OF ALL RESPONSES TO PHQ QUESTIONS 1-9: 0
SUM OF ALL RESPONSES TO PHQ9 QUESTIONS 1 & 2: 0

## 2022-08-18 ASSESSMENT — LIFESTYLE VARIABLES
HOW MANY STANDARD DRINKS CONTAINING ALCOHOL DO YOU HAVE ON A TYPICAL DAY: 1
HOW MANY STANDARD DRINKS CONTAINING ALCOHOL DO YOU HAVE ON A TYPICAL DAY: 1 OR 2
HOW OFTEN DO YOU HAVE A DRINK CONTAINING ALCOHOL: 2
HOW OFTEN DO YOU HAVE A DRINK CONTAINING ALCOHOL: MONTHLY OR LESS
HOW OFTEN DO YOU HAVE SIX OR MORE DRINKS ON ONE OCCASION: 1

## 2022-08-18 ASSESSMENT — SOCIAL DETERMINANTS OF HEALTH (SDOH): HOW HARD IS IT FOR YOU TO PAY FOR THE VERY BASICS LIKE FOOD, HOUSING, MEDICAL CARE, AND HEATING?: NOT HARD AT ALL

## 2022-08-18 NOTE — PROGRESS NOTES
Medicare Annual Wellness Visit    Sammy Nicholas is here for Medicare AWV    Assessment & Plan   Medicare annual wellness visit, subsequent  B12 deficiency  Hypercholesterolemia  -     Comprehensive Metabolic Panel; Future  -     Lipid, Fasting; Future  Malaise and fatigue  -     Vitamin B12 & Folate; Future  -     CBC with Auto Differential; Future  Hearing loss, unspecified hearing loss type, unspecified laterality  -     Cydney Leggett Page Hanna POWELL, Audiology, [place]    Recommendations for Preventive Services Due: see orders and patient instructions/AVS.  Recommended screening schedule for the next 5-10 years is provided to the patient in written form: see Patient Instructions/AVS.     Return for Medicare Annual Wellness Visit in 1 year. Subjective     C/o fatigue, difficulty losing weight. Would like audiology referral.     Would like to stop alendronate when she can. Started 12 months ago. C/o GERD sx with it. Not interested in Prolia. Did see spine specialist. Underwent 2 BRYSON of cervical spine with good results. Patient's complete Health Risk Assessment and screening values have been reviewed and are found in Flowsheets. The following problems were reviewed today and where indicated follow up appointments were made and/or referrals ordered. Positive Risk Factor Screenings with Interventions:               Hearing/Vision:  Do you or your family notice any trouble with your hearing that hasn't been managed with hearing aids?: (!) Yes  Do you have difficulty driving, watching TV, or doing any of your daily activities because of your eyesight?: No  Have you had an eye exam within the past year?: Yes  No results found.   Hearing/Vision Interventions:  Hearing concerns:  audiology referral provided            Objective   Vitals:    08/18/22 1115   BP: 110/67   Cuff Size: Medium Adult   Pulse: 58   Temp: 97.6 °F (36.4 °C)   TempSrc: Temporal   SpO2: 97%   Weight: 170 lb 9.6 oz (77.4 kg)   Height: 5' 4.5\" (1.638 m)      Body mass index is 28.83 kg/m². Allergies   Allergen Reactions    Codeine Itching     Itching     Prior to Visit Medications    Medication Sig Taking?  Authorizing Provider   alendronate (FOSAMAX) 70 MG tablet TAKE 1 TABLET BY MOUTH ONE TIME PER WEEK Yes JAKY Servin - CNP       CareTeam (Including outside providers/suppliers regularly involved in providing care):   Patient Care Team:  Xander Luo DO as PCP - General (Family Medicine)  Selene Uribe MD as PCP - OBGYN (Obstetrics & Gynecology)  Xander Luo DO as PCP - [facility] Empaneled Provider     Reviewed and updated this visit:  Tobacco  Allergies  Meds  Med Hx  Surg Hx  Soc Hx  Fam Hx

## 2022-08-18 NOTE — PATIENT INSTRUCTIONS
[facility] Locations - No appointment necessary. @ indicates the location is open Saturdays in addition to Monday through Friday. Call your preferred location for test preparation, business hours and other information you need. [employer] accepts [employer]. [facility]    @ [place] [facility] [address] Amrit Sharma. [address]   Ph: [phone] [facility] Sv[facility]. [address]   Ph: [phone]  @ [facility] S[facility]. [address] [facility], [facility]   Ph: [phone]    [facility] Sv[facility]. [address] greg[address]   Ph: [phone] @ [place] [facility]. [address] [facility], [facility]  Ph: [phone] @ [place] [facility]. [address] [place] [address]. [address]   Ph: [phone]     [facility] Sv. [facility] Aditi [address]  Ph: [phone]    [place]   @ [place] [facility] Svcs. [address] [address]   Ph: [phone] [facility]. Office [facility]. [address] [place] [address]  Ph: [address]:  [address] Lab Svcs[address] [facility]   Ph: [phone] [facility]. Lab Svcs. [address] [address]   Ph: [phone]       Personalized Preventive Plan for Janel Pacheco - 8/18/2022  Medicare offers a range of preventive health benefits. Some of the tests and screenings are paid in full while other may be subject to a deductible, co-insurance, and/or copay. Some of these benefits include a comprehensive review of your medical history including lifestyle, illnesses that may run in your family, and various assessments and screenings as appropriate.     After reviewing your medical record and screening and assessments performed today your provider may have ordered immunizations, labs, imaging, and/or referrals for you. A list of these orders (if applicable) as well as your Preventive Care list are included within your After Visit Summary for your review. Other Preventive Recommendations:    A preventive eye exam performed by an eye specialist is recommended every 1-2 years to screen for glaucoma; cataracts, macular degeneration, and other eye disorders. A preventive dental visit is recommended every 6 months. Try to get at least 150 minutes of exercise per week or 10,000 steps per day on a pedometer . Order or download the FREE \"Exercise & Physical Activity: Your Everyday Guide\" from [employer]. Call 2-672.455.9742 or search [employer] online. You need 8809-7319 mg of calcium and 0844-8852 IU of vitamin D per day. It is possible to meet your calcium requirement with diet alone, but a vitamin D supplement is usually necessary to meet this goal.  When exposed to the sun, use a sunscreen that protects against both UVA and UVB radiation with an SPF of 30 or greater. Reapply every 2 to 3 hours or after sweating, drying off with a towel, or swimming. Always wear a seat belt when traveling in a car. Always wear a helmet when riding a bicycle or motorcycle.

## 2022-08-19 DIAGNOSIS — E78.00 HYPERCHOLESTEROLEMIA: ICD-10-CM

## 2022-08-19 DIAGNOSIS — R53.81 MALAISE AND FATIGUE: ICD-10-CM

## 2022-08-19 DIAGNOSIS — R53.83 MALAISE AND FATIGUE: ICD-10-CM

## 2022-08-19 LAB
A/G RATIO: 2.4 (ref 1.1–2.2)
ALBUMIN SERPL-MCNC: 4.5 G/DL (ref 3.4–5)
ALP BLD-CCNC: 67 U/L (ref 40–129)
ALT SERPL-CCNC: 13 U/L (ref 10–40)
ANION GAP SERPL CALCULATED.3IONS-SCNC: 11 MMOL/L (ref 3–16)
AST SERPL-CCNC: 14 U/L (ref 15–37)
BASOPHILS ABSOLUTE: 0.1 K/UL (ref 0–0.2)
BASOPHILS RELATIVE PERCENT: 1.4 %
BILIRUB SERPL-MCNC: 0.5 MG/DL (ref 0–1)
BUN BLDV-MCNC: 16 MG/DL (ref 7–20)
CALCIUM SERPL-MCNC: 9 MG/DL (ref 8.3–10.6)
CHLORIDE BLD-SCNC: 107 MMOL/L (ref 99–110)
CHOLESTEROL, FASTING: 217 MG/DL (ref 0–199)
CO2: 27 MMOL/L (ref 21–32)
CREAT SERPL-MCNC: 0.8 MG/DL (ref 0.6–1.2)
EOSINOPHILS ABSOLUTE: 0.1 K/UL (ref 0–0.6)
EOSINOPHILS RELATIVE PERCENT: 3.6 %
FOLATE: 7.96 NG/ML (ref 4.78–24.2)
GFR AFRICAN AMERICAN: >60
GFR NON-AFRICAN AMERICAN: >60
GLUCOSE BLD-MCNC: 76 MG/DL (ref 70–99)
HCT VFR BLD CALC: 39.2 % (ref 36–48)
HDLC SERPL-MCNC: 45 MG/DL (ref 40–60)
HEMOGLOBIN: 13.2 G/DL (ref 12–16)
LDL CHOLESTEROL CALCULATED: 148 MG/DL
LYMPHOCYTES ABSOLUTE: 1.4 K/UL (ref 1–5.1)
LYMPHOCYTES RELATIVE PERCENT: 33 %
MCH RBC QN AUTO: 31.6 PG (ref 26–34)
MCHC RBC AUTO-ENTMCNC: 33.5 G/DL (ref 31–36)
MCV RBC AUTO: 94.1 FL (ref 80–100)
MONOCYTES ABSOLUTE: 0.3 K/UL (ref 0–1.3)
MONOCYTES RELATIVE PERCENT: 7.3 %
NEUTROPHILS ABSOLUTE: 2.3 K/UL (ref 1.7–7.7)
NEUTROPHILS RELATIVE PERCENT: 54.7 %
PDW BLD-RTO: 13.7 % (ref 12.4–15.4)
PLATELET # BLD: 175 K/UL (ref 135–450)
PMV BLD AUTO: 9.2 FL (ref 5–10.5)
POTASSIUM SERPL-SCNC: 4.8 MMOL/L (ref 3.5–5.1)
RBC # BLD: 4.17 M/UL (ref 4–5.2)
SODIUM BLD-SCNC: 145 MMOL/L (ref 136–145)
TOTAL PROTEIN: 6.4 G/DL (ref 6.4–8.2)
TRIGLYCERIDE, FASTING: 119 MG/DL (ref 0–150)
VITAMIN B-12: 318 PG/ML (ref 211–911)
VLDLC SERPL CALC-MCNC: 24 MG/DL
WBC # BLD: 4.2 K/UL (ref 4–11)

## 2022-08-23 ENCOUNTER — PROCEDURE VISIT (OUTPATIENT)
Dept: AUDIOLOGY | Age: 67
End: 2022-08-23
Payer: MEDICARE

## 2022-08-23 DIAGNOSIS — H90.3 SENSORINEURAL HEARING LOSS, BILATERAL: Primary | ICD-10-CM

## 2022-08-23 DIAGNOSIS — H93.13 TINNITUS, BILATERAL: ICD-10-CM

## 2022-08-23 PROCEDURE — 92557 COMPREHENSIVE HEARING TEST: CPT | Performed by: AUDIOLOGIST

## 2022-08-23 PROCEDURE — 92567 TYMPANOMETRY: CPT | Performed by: AUDIOLOGIST

## 2022-08-23 NOTE — PROGRESS NOTES
consistent with hypermobile tympanic membrane. Hearing Sensitivity: Within enrike limits sloping to moderately-severe sensorineural hearing loss. Speech Recognition Threshold: 30 dBHL  Word Recognition: Excellent (92%), based on NU-6 25-word list at 70 dBHL using recorded speech stimuli. Tympanometry: Normal peak pressure with high compliance, Type Ad tympanogram, consistent with hypermobile tympanic membrane. NOTE: An asymmetry of 15-20 dBHL is present at 4000 Hz and 8000 Hz with left ear worse than rihgt ear. Reliability: Good  Transducer: Inserts    See scanned audiogram dated 8/23/2022 for results. PATIENT EDUCATION:       The following items were discussed with the patient:   - Good Communication Strategies  - Hearing Loss and Hearing Aids  - Tinnitus Management Strategies      Educational information was shared in the After Visit Summary. RECOMMENDATIONS:                                                                                                                                                                                                                                                                      The following items are recommended based on patient report and results from today's appointment:  - Continue medical follow-up with Nadeen Valle DO.  - Consider ENT evaluation given borderline asymmetry with LE>RE in high frequencies; discussed this can be monitored with repeat testing in the future and if it worsens, ENT evaluation may be warranted. - Retest hearing as medically indicated and/or sooner if a change in hearing is noted. - If desired, schedule a Hearing Aid Evaluation (HAE) appointment to discuss hearing aid options. - Utilize \"Good Communication Strategies\" as discussed to assist in speech understanding with communication partners.   - Maintain a sound enriched environment to assist in the management of tinnitus symptoms.          [facility] [place]  Audiologist      Chart CC'd to: DO Merry       Degree of   Hearing Sensitivity dB Range   Within Normal Limits (WNL) 0 - 20   Mild 20 - 40   Moderate 40 - 55   Moderately-Severe 55 - 70   Severe 70 - 90   Profound 90 +

## 2022-08-23 NOTE — PATIENT INSTRUCTIONS
Good Communication Strategies    Communication can be challenging for anyone, but can be especially difficult for those with some degree of hearing loss. While we may not be able to control every factor that may lead to difficulty with communication, there are Good Communication Strategies that we can all use in our day-to-day lives. Communication takes both parties working together for it to be successful. Tips as a Listener:   Control your environment. It is important to limit the amount of background noise in the room when possible. You should also consider having a good light source in the room to best see the other person. Ask for clarification. Instead of saying \"What?\", you can use parts of what you heard to make a new question. For example, if you heard the word \"Thursday\" but not the rest of the week, you may ask \"What was that about Thursday? \" or \"What did you want to do Thursday? \". This shows the person talking that you are listening and will help them better explain what they are saying. Be an advocate for yourself. If you are hearing but not understanding, tell the other person \"I can hear you, but I need you to slow down when you speak. \"  Or if someone is facing the other direction, say \"I cannot hear you when you are not looking at me when we talk. \"       Tips as a Talker:   - Sit or stand 3 to 6 feet away to maximize audibility         -- It is unrealistic to believe someone else will fully hear your message if you are speaking from across the room or in a different room in the house   - Stay at eye level to help with visual cues   - Make sure you have the persons attention before speaking   - Use facial expressions and gestures to accentuate your message   - Raise your voice slightly (do not scream)   - Speak slowly and distinctly   - Use short, simple sentences   - Rephrase your words if the person is having a hard time understanding you    - To avoid distortion, dont speak directly into a persons ear      Some additional items that may be helpful:   - Remain patient - this is important for both parties   - Write down items that still cannot be heard/understood. You may write with pen/paper or consider typing/texting on a cell phone or smart device. - If background noise is unavoidable, try to keep yourself in a good position in the room. By sitting at a collazo on the side of the restaurant (preferably a corner), it will be easier to communicate than if you were sitting at a table in the middle with background noise surrounding you. Keep yourself positioned away from music speakers or heavy foot traffic.   - If you have difficulty with the television, consider these options:      -- Use closed-captioning, which is a setting you can turn on that displays the spoken words in a written form on the screen. There may be a slight delay, but this can help fill in missing information. This can be especially helpful when watching programs with accented speech. -- Consider use of a sound bar or speakers that come from the front of the TV. With modern flat screen TVs, many of them have speakers that come out of the back of the device, which makes sound bounce off the wall behind it, then go into the room. Sound bars can allow the sound to go straight in your direction and can improve sound quality. -- Consider ear level devices to help improve the volume and/or sound quality of the program.  There are devices that work like headphones that you can adjust the volume for your ears while others can have the volume at a more comfortable level, such as \"TV Ears\". Most hearing aids have devices that allow them to connect directly to the TV and improve sound quality. Tinnitus: Overview and Management Strategies          Many people have some ringing sounds in their ears once in a while. You may hear a roar, a hiss, a tinkle, or a buzz. The sound usually lasts only a few minutes.  If it to:  Avoid noises that you think caused your tinnitus. If you can't avoid loud noises, wear earplugs or earmuffs. Ignore the sound by paying attention to other things. Keeping your brain busy with other tasks or background noise can help your brain not focus on the tinnitus. Try to not give the tinnitus an emotional reaction. Do your best to ignore the sound and not let it bother you. Relax using biofeedback, meditation, or yoga. Feeling stressed and being tired can make tinnitus worse. Play music or white noise to help you sleep. Background noise may cover up the noise that you hear in your ears. You can buy a tabletop machine or a device that sits under your pillow to play soothing sounds, like ocean waves. Smart phones have free apps, such as Whist, Relax Melodies, ReSound Relief, and [employer]. These apps have different types of sounds/noise, some of which you can blend together to find sounds that are most soothing to you. Hearing aid technology, especially when there is some hearing loss, may help reduce tinnitus symptoms by giving your brain better access to the sounds it is missing. There are some hearing aids with built-in noise generator programs, which may help when amplification alone is not enough. Additional resources may be found through the American Tinnitus Association at www.tiana.org    When should you call for help? Call 911 anytime you think you may need emergency care. For example, call if:    You have symptoms of a stroke. These may include:  Sudden numbness, tingling, weakness, or loss of movement in your face, arm, or leg, especially on only one side of your body. Sudden vision changes. Sudden trouble speaking. Sudden confusion or trouble understanding simple statements. Sudden problems with walking or balance. A sudden, severe headache that is different from past headaches. Call your doctor now or seek immediate medical care if:    You develop other symptoms. These may include hearing loss (or worse hearing loss), balance problems, dizziness, nausea, or vomiting. Watch closely for changes in your health, and be sure to contact your doctor if:    Your tinnitus moves from both ears to one ear. Your hearing loss gets worse within 1 day after an ear injury. Your tinnitus or hearing loss does not get better within 1 week after an ear injury. Your tinnitus bothers you enough that you want to take medicines to help you cope with it. If you notice changes in your tinnitus and/or your hearing, it is recommended that you have your hearing tested by your audiologist and to follow-up with your physician that manages your hearing loss (such as your ENT or Primary Care doctor). Hearing Loss: Care Instructions  Your Care Instructions      Hearing loss is a sudden or slow decrease in how well you hear. It can range from mild to profound. Permanent hearing loss can occur with aging, and it can happen when you are exposed long-term to loud noise. Examples include listening to loud music, riding motorcycles, or being around other loud machines. Hearing loss can affect your work and home life. It can make you feel lonely or depressed. You may feel that you have lost your independence. But hearing aids and other devices can help you hear better and feel connected to others. Follow-up care is a key part of your treatment and safety. Be sure to make and go to all appointments, and call your doctor if you are having problems. It's also a good idea to know your test results and keep a list of the medicines you take. How can you care for yourself at home? Avoid loud noises whenever possible. This helps keep your hearing from getting worse. Always wear hearing protection around loud noises. If appropriate, wear hearing aid(s) as directed.   It is recommended that hearing aids are worn during all waking hours to keep your brain active and give it access to the sounds it is missing. If you are beginning your process with hearing aid(s), schedule a \"Hearing Aid Evaluation\" with an audiologist to discuss your lifestyle, features of hearing aid technology, and styles of hearing aids available. It is recommended that you contact your insurance company to determine if you have a hearing aid benefit, as this may dictate who you can see for these services. Have hearing tests as your doctor suggests. They can show whether your hearing has changed. Your hearing aid may need to be adjusted. Use other assistive devices as needed. These may include:  Telephone amplifiers and hearing aids that can connect to a television, stereo, radio, or microphone. Devices that use lights or vibrations. These alert you to the doorbell, a ringing telephone, or a baby monitor. Television closed-captioning. This shows the words at the bottom of the screen. Most new TVs can do this. TTY (text telephone). This lets you type messages back and forth on the telephone instead of talking or listening. These devices are also called TDD. When messages are typed on the keyboard, they are sent over the phone line to a receiving TTY. The message is shown on a monitor. Use pagers, fax machines, text, and email if it is hard for you to communicate by telephone. Try to learn a listening technique called speech-reading. It is not lip-reading. You pay attention to people's gestures, expressions, posture, and tone of voice. These clues can help you understand what a person is saying. Face the person you are talking to, and have him or her face you. Make sure the lighting is good. You need to see the other person's face clearly. Think about counseling if you need help to adjust to your hearing loss. When should you call for help? Watch closely for changes in your health, and be sure to contact your doctor if:    You think your hearing is getting worse.      You have new symptoms, such as dizziness or nausea.

## 2022-08-23 NOTE — Clinical Note
Dr. Kay Pérez,  Thank you for your referral for audiologic testing on this patient. Today's results are consistent with bilateral sensorineural hearing loss, LE>RE in high frequencies with hypermobile TMs and excellent word recognition bilaterally. Hearing loss is significant enough to result in difficulty understanding speech in most listening environments. Discussed good communication strategies and tinnitus management strategies, as well as considerations to see ENT as she has a borderline asymmetry at this time. Recommended hearing aid evaluation which was scheduled. If you have any questions, or if there is anything else you need, please let me know.    [place],  [place] , [place] Audiologist --- [address]  [address] - Audiology

## 2022-09-08 ENCOUNTER — PROCEDURE VISIT (OUTPATIENT)
Dept: AUDIOLOGY | Age: 67
End: 2022-09-08

## 2022-09-08 DIAGNOSIS — H90.3 SENSORINEURAL HEARING LOSS, BILATERAL: Primary | ICD-10-CM

## 2022-09-08 PROCEDURE — 99999 PR OFFICE/OUTPT VISIT,PROCEDURE ONLY: CPT | Performed by: AUDIOLOGIST

## 2022-09-09 NOTE — PROGRESS NOTES
Ge Goldman   1955, 75 y.o. female  [id number]       HEARING AID EVALUATION    Ge Goldman was seen today, 9/8/2022 for a Hearing Aid Evaluation, accompanied by her . Patient has no prior history of hearing aid use. A pair of demrylee AbdiJoshua Aguilar P90-R hearing aids were programmed to 90% and were used in today's appointment; she preferred the volume a bit softer, especially for left ear. INSURANCE:  Her insurance was contacted and she does not have a benefit for hearing aids to use in this office (please see scanned insurance verification worksheet in Media tab). SOUNDFIELD TESTING:       Otoscopy revealed: Clear ear canals bilaterally     SFT was deferred. LIFESTYLE EVALUATION:       Primary listening situations Ge Goldman would like to improve:  Groups  's voice  Television    Other pertinent lifestyle needs: Restaurants a couple times per week    Phone connectivity:  Not strong factor    DEVICE SELECTION:       After a discussion of the various styles, technology levels, and features of available in hearing aid technology in relation to his lifestyle needs, Ge Goldman has decided to pursue hearing aids. Technology to be ordered: (2) Phonak Audeo L50-R, 1M receivers, medium open domes, color P1      Patient cost due at time of fitting: $4000. PATIENT EDUCATION:         - Verbally reviewed various styles, technology levels, and features of available hearing aid technology and realistic expectations with amplification.   - Patient was provided with a brochure for [employer]  hearing aids; discussed that Qiana Robert is newest computer chip. RECOMMENDATIONS:        - Return in 2-3 weeks to be fit with hearing aids. No charge for today's appointment.       [facility] [place], [place]  Audiologist

## 2022-09-15 DIAGNOSIS — M81.0 AGE-RELATED OSTEOPOROSIS WITHOUT CURRENT PATHOLOGICAL FRACTURE: ICD-10-CM

## 2022-09-15 RX ORDER — ALENDRONATE SODIUM 70 MG/1
TABLET ORAL
Qty: 12 TABLET | Refills: 3 | Status: SHIPPED | OUTPATIENT
Start: 2022-09-15

## 2022-09-23 ENCOUNTER — PROCEDURE VISIT (OUTPATIENT)
Dept: AUDIOLOGY | Age: 67
End: 2022-09-23

## 2022-09-23 DIAGNOSIS — H90.3 SENSORINEURAL HEARING LOSS, BILATERAL: Primary | ICD-10-CM

## 2022-09-23 PROCEDURE — V5020 CONFORMITY EVALUATION: HCPCS | Performed by: AUDIOLOGIST

## 2022-09-23 PROCEDURE — V5160 DISPENSING FEE BINAURAL: HCPCS | Performed by: AUDIOLOGIST

## 2022-09-23 PROCEDURE — V5265 EAR MOLD/INSERT, DISP: HCPCS | Performed by: AUDIOLOGIST

## 2022-09-23 PROCEDURE — V5011 HEARING AID FITTING/CHECKING: HCPCS | Performed by: AUDIOLOGIST

## 2022-09-23 PROCEDURE — V5261 HEARING AID, DIGIT, BIN, BTE: HCPCS | Performed by: AUDIOLOGIST

## 2022-09-23 PROCEDURE — V5299 HEARING SERVICE: HCPCS | Performed by: AUDIOLOGIST

## 2022-09-23 NOTE — PROGRESS NOTES
Cony Contreras   1955, 75 y.o. female   [id number]     HEARING AID FITTING      RIGHT EAR: /MODEL: Hopkins Golfan Haw L50-R  SN: [id number]  WIRE/DOME: 1M/medium open    LEFT EAR: /MODEL: Pearlean Haw L50-R  SN: [id number]  WIRE/DOME: 1M/medium open    : /MODEL: Phonak  Ease  SN: [id number]   HAF: 9/23/2022  WARRANTY: 12/8/2025    BUTTONS: Short Press (Volume): Up raises, Down lowers  PROGRAMS: [employer]  PHONE CONNECTIVITY: None at fitting      Cony Contreras was seen today, 9/23/2022, to be fit with binaural Pearlean [employer] L50-R hearing aids. Otoscopy revealed clear ear canals bilaterally, and devices appeared to be a good fit. Devices were connected to fitting software, and hearing aids were programmed to 90% target gain. Feedback manager was completed. Device orientation was completed, including:   Hearing aid insertion/removal  Buttons/Indicators  Battery charging, life expectancy   Battery charging and insertion/removal  Cleaning/maintenance of the device  Telephone use and phone placement  Acclimatization period and importance of consistent use of devices  Realistic expectations with hearing aids  Repair/Loss & Damage warranty information  30-day right-to-return period    Cony Contreras noted good sound quality. It was recommended that patient return for a follow-up appointment within the 30-day right-to-return period for further programming adjustments and education of the devices as warranted. PATIENT EDUCATION:     Cony Contreras was provided with the Hearing Aid Fitting Checklist as a reference of items discussed at today's appointment. Patient may find additional product information in the provided hearing aid  device manual.     RECOMMENDATIONS:     - Return for follow-up during right-to-return period; an appointment was scheduled for 10/6/2022.   - Goal of consistent daily hearing aid use during all waking hours.    - Contact Audiology if concerns arise. Patient paid $4000; ABN was signed to not bill Medicare. See associated charges below.     Description Code Amount   Hearing Aids  $2990.00   Dispensing Fee  $300.00   Fitting Fee (Non-Refundable)  $300.00   Earmold Non-Custom  x2 $50.00 x2 = $100.00   1-Year Service Plan  $250.00   Conformity Evaluation   (Real-Ear Measurements)  $60.00        Jimi Grant  Audiologist

## 2022-10-06 ENCOUNTER — PROCEDURE VISIT (OUTPATIENT)
Dept: AUDIOLOGY | Age: 67
End: 2022-10-06

## 2022-10-06 DIAGNOSIS — H90.3 SENSORINEURAL HEARING LOSS, BILATERAL: Primary | ICD-10-CM

## 2022-10-06 PROCEDURE — 99999 PR OFFICE/OUTPT VISIT,PROCEDURE ONLY: CPT | Performed by: AUDIOLOGIST

## 2022-10-06 NOTE — PROGRESS NOTES
Suzanne Christopher   1955, 75 y.o. female   [id number]     RIGHT EAR: /MODEL: Rea Kathy L50-R  SN: [id number]  WIRE/DOME: 1M/medium open    LEFT EAR: /MODEL: Rea Kathy L50-R  SN: [id number]  WIRE/DOME: 1M/medium open    : /MODEL: Phonak  Ease  SN: [id number]   HAF: 9/23/2022  WARRANTY: 12/8/2025     BUTTONS: Short Press (Volume): Up raises, Down lowers  PROGRAMS: [employer]  PHONE CONNECTIVITY: None at [address]az was seen today, 10/6/2022, for a hearing aid check appointment, accompanied by her , Hari. Patient noted she has felt the need to raise the volume of her devices, especially with television. She is not doing as well with the devices as she would hope to do at this time. PROCEDURES:     Otoscopy revealed: Clear ear canals bilaterally  Reviewed changing domes and wax traps; she was able to complete this without difficulty. Microphone ports were suctioned, and devices completed a desiccant cycle through [employer]. Post-cleaning listening check revealed good function of the devices. Connected devices to fitting software and completed real ear measurements with [employer] 2. Increased to 100% target gain. Made fine-tuning gain adjustments for devices to be in good agreement with NAL-NL2 targets for soft, average, and loud speech. MPO was in good range. After REMs, she noted that the devices were a bit loud and softer sounds were bothersome. Reduced left sided gain due to unbalanced sensation. Also reduced overall gain to be more comfortable and settled at 93%. Datalogging revealed 11.9 hours of use per day. PATIENT EDUCATION:      - Reviewed general care and maintenance of hearing aids. - Reviewed right-to-return period; he noted he does not plan to return the devices. - Reviewed programming changes made today and goal of REMs.   RECOMMENDATIONS:     Continue consistent hearing aid use. Return for hearing aid checks as needed; an appointment was scheduled for 11/2/2022. Contact Audiology if concerns arise. No charge for today's appointment; patient under service warranty through 9/23/2022.         [facility] [place], [place]  Audiologist

## 2022-11-02 ENCOUNTER — PROCEDURE VISIT (OUTPATIENT)
Dept: AUDIOLOGY | Age: 67
End: 2022-11-02

## 2022-11-02 DIAGNOSIS — H90.3 SENSORINEURAL HEARING LOSS, BILATERAL: Primary | ICD-10-CM

## 2022-11-02 PROCEDURE — 99999 PR OFFICE/OUTPT VISIT,PROCEDURE ONLY: CPT | Performed by: AUDIOLOGIST

## 2022-11-02 NOTE — PROGRESS NOTES
Rose Mary Hartley   1955, 75 y.o. female   [id number]     RIGHT EAR: /MODEL: Christelle Downy L50-R  SN: [id number]  WIRE/DOME: 1M/medium open    LEFT EAR: /MODEL: Christelle Downy L50-R  SN: [id number]  WIRE/DOME: 1M/medium open    : /MODEL: Phonak  Ease  SN: [id number]   HAF: 9/23/2022  WARRANTY: 12/8/2025     BUTTONS: Short Press (Volume): Up raises, Down lowers  PROGRAMS: [employer]  PHONE CONNECTIVITY: None at [address] was seen today, 11/2/2022, for a hearing aid check appointment, accompanied by her , Hari. Patient noted she does turn the volume down at times on her hearing aids. PROCEDURES:     Otoscopy revealed: Clear ear canals bilaterally  Hearing aids were cleaned and checked. A listening check revealed good function of the devices. Microphone and  ports were suctioned, wax traps were changed, and devices completed a desiccant cycle through [employer]. Post-cleaning listening check revealed good function of the devices. Connected devices to fitting software. Strengthened noise block in automatic programs and soft noise reduction. Adjusted volume to 90% and turned on adaptation manager to increase to 95% over 60 days. Datalogging revealed 10.3 hours of use per day. PATIENT EDUCATION:      - Reviewed programming changes made to day. RECOMMENDATIONS:     Continue consistent hearing aid use  Return for hearing aid checks as needed  Contact Audiology if concerns arise. No charge for today's appointment; patient under service warranty through 9/23/2023.         [facility] [place], [place]  Audiologist

## 2022-11-18 ENCOUNTER — TELEMEDICINE (OUTPATIENT)
Dept: PRIMARY CARE CLINIC | Age: 67
End: 2022-11-18
Payer: MEDICARE

## 2022-11-18 DIAGNOSIS — U07.1 COVID-19: Primary | ICD-10-CM

## 2022-11-18 PROCEDURE — 99213 OFFICE O/P EST LOW 20 MIN: CPT | Performed by: NURSE PRACTITIONER

## 2022-11-18 PROCEDURE — 3017F COLORECTAL CA SCREEN DOC REV: CPT | Performed by: NURSE PRACTITIONER

## 2022-11-18 PROCEDURE — 1123F ACP DISCUSS/DSCN MKR DOCD: CPT | Performed by: NURSE PRACTITIONER

## 2022-11-18 PROCEDURE — G8399 PT W/DXA RESULTS DOCUMENT: HCPCS | Performed by: NURSE PRACTITIONER

## 2022-11-18 PROCEDURE — G8428 CUR MEDS NOT DOCUMENT: HCPCS | Performed by: NURSE PRACTITIONER

## 2022-11-18 PROCEDURE — 1090F PRES/ABSN URINE INCON ASSESS: CPT | Performed by: NURSE PRACTITIONER

## 2022-11-18 ASSESSMENT — ENCOUNTER SYMPTOMS
ABDOMINAL PAIN: 0
WHEEZING: 0
RHINORRHEA: 1
SHORTNESS OF BREATH: 0

## 2022-11-18 NOTE — PROGRESS NOTES
[address] [facility] [address] PRIMARY CARE  [address] [place] [id number]  Dept: [phone]  Dept Fax: [phone]     2022      Vangie Shun   1955     Chief Complaint   Patient presents with    Positive For Covid-19         HPI  Pt encounter today completed virtual visit using [employer] platform. Services were provided through a video synchronous discussion virtually to substitute for in-person clinic visit. Patient and provider were located at their individual homes/offices. Virtual visit with patient to discuss sick symptoms. Reports she started with body aches, chills, rhinorrhea, mild congestion on Wed. Took a Covid test this morning and it was positive. Max temp 99.9F. She states it feels like the flu or a bad cold. Denies chest pain, shortness of breath. PHQ Scores 2022 2021 2020 10/24/2019 2019 2018   PHQ2 Score 0 0 0 0 0 0   PHQ9 Score 0 0 0 0 0 0     Interpretation of Total Score Depression Severity: 1-4 = Minimal depression, 5-9 = Mild depression, 10-14 = Moderate depression, 15-19 = Moderately severe depression, 20-27 = Severe depression     Prior to Visit Medications    Medication Sig Taking? Authorizing Provider   alendronate (FOSAMAX) 70 MG tablet TAKE 1 TABLET BY MOUTH ONE TIME PER WEEK Yes Ju Nye, DO       Past Medical History:   Diagnosis Date    Asthma, exercise induced     Benign positional vertigo 2013    Osteoarthritis ?         Social History     Tobacco Use    Smoking status: Former     Packs/day: 1.00     Years: 15.00     Pack years: 15.00     Types: Cigarettes     Quit date: 1992     Years since quittin.5    Smokeless tobacco: Never   Substance Use Topics    Alcohol use: Yes     Comment: Rare    Drug use: No        Past Surgical History:   Procedure Laterality Date    BREAST CYST EXCISION      Benign cysts R breast    COLONOSCOPY      Dr. Jose L Pedersen normal repeat 10 years    KNEE ARTHROSCOPY  ~ 1999    L knee meniscus shaving    ROTATOR CUFF REPAIR  2006 (approx)    Both shoulders        Allergies   Allergen Reactions    Codeine Itching     Itching        Family History   Problem Relation Age of Onset    Cancer Mother         Lung    Cancer Father         Lung    Cancer Brother         Lymphoma    Other Brother         lymphoma    Kidney Disease Sister     Ovarian Cancer Maternal Grandmother     Cancer Brother         Bile Duct        Patient's past medical history, surgical history, family history, medications, and allergies  were all reviewed and updated as appropriate today. Review of Systems   Constitutional:  Positive for chills, fatigue and fever. HENT:  Positive for congestion and rhinorrhea. Respiratory:  Negative for shortness of breath and wheezing. Cardiovascular:  Negative for chest pain. Gastrointestinal:  Negative for abdominal pain. Genitourinary:  Negative for difficulty urinating. Musculoskeletal:  Positive for myalgias. Neurological:  Negative for dizziness. Hematological:  Negative for adenopathy. Psychiatric/Behavioral: Negative. Vitals unable to obtain 2/2 virtual visit nature of this encounter. Physical Exam  Constitutional:       General: She is not in acute distress. Appearance: She is ill-appearing. HENT:      Head: Normocephalic and atraumatic. Eyes:      Extraocular Movements: Extraocular movements intact. Pulmonary:      Effort: Pulmonary effort is normal.   Neurological:      General: No focal deficit present. Mental Status: She is alert and oriented to person, place, and time. Psychiatric:         Mood and Affect: Mood normal.         Behavior: Behavior normal.       Assessment:  Encounter Diagnosis   Name Primary? [id number]-[id number] Yes       Plan:  1. COVID-19  Acute onset mild illness; patient reports home covid test is positive. Patient does not appear in acute distress on video visit; no red flags on history. Discussed expected course of illness and OTC agents that can be used for symptomatic relief. Discussed Paxlovid including risks and benefits. At this time we have decided to use OTC medications only and she will follow up with any worsening symptoms. Given precautions and answered all questions. Return if symptoms worsen or fail to improve. Catarina Mcdaniel, APRN - [facility]           Rose Mary Hartley is a 75 y.o. female being evaluated by a Virtual Visit (video visit) encounter to address concerns as mentioned above. A caregiver was present when appropriate. Due to this being a TeleHealth encounter (During NTQJV-66 public health emergency), evaluation of the following organ systems was limited: Vitals/Constitutional/EENT/Resp/CV/GI//MS/Neuro/Skin/Heme-Lymph-Imm. Pursuant to the emergency declaration under the [facility], [address] [place] [address] authority and the [employer] Act, this Virtual Visit was conducted with patient's (and/or legal guardian's) consent, to reduce the patient's risk of exposure to COVID-19 and provide necessary medical care. The patient (and/or legal guardian) has also been advised to contact this office for worsening conditions or problems, and seek emergency medical treatment and/or call 911 if deemed necessary.

## 2023-01-04 ENCOUNTER — HOSPITAL ENCOUNTER (OUTPATIENT)
Dept: WOMENS IMAGING | Age: 68
Discharge: HOME | End: 2023-01-04
Payer: MEDICARE

## 2023-01-04 DIAGNOSIS — Z12.31 VISIT FOR SCREENING MAMMOGRAM: ICD-10-CM

## 2023-01-04 PROCEDURE — 77063 BREAST TOMOSYNTHESIS BI: CPT

## 2023-01-09 ENCOUNTER — PROCEDURE VISIT (OUTPATIENT)
Dept: AUDIOLOGY | Age: 68
End: 2023-01-09

## 2023-01-09 DIAGNOSIS — H90.3 SENSORINEURAL HEARING LOSS, BILATERAL: Primary | ICD-10-CM

## 2023-01-09 PROCEDURE — 99999 PR OFFICE/OUTPT VISIT,PROCEDURE ONLY: CPT | Performed by: AUDIOLOGIST

## 2023-01-09 NOTE — PROGRESS NOTES
Talat Luna   1955, 75 y.o. female   [id number]     RIGHT EAR: /MODEL: Dallis [place] L50-R  SN: [id number]  WIRE/DOME: 1M/medium open    LEFT EAR: /MODEL: Dallis [place] L50-R  SN: [id number]  WIRE/DOME: 1M/medium open    : /MODEL: Phonak  Ease  SN: [id number]   HAF: 9/23/2022  WARRANTY: 12/8/2025     BUTTONS: Short Press (Volume): Up raises, Down lowers  PROGRAMS: [employer]  PHONE CONNECTIVITY: None at [address] was seen today, 1/9/2023, for a hearing aid check appointment, accompanied by her , Hari. Patient noted she does have some difficulty in groups and finds background noise to overwhelm voices. PROCEDURES:     Otoscopy revealed: Clear ear canals bilaterally  Hearing aids were cleaned and checked. A listening check revealed good function of the devices. Microphone and  ports were suctioned, wax traps were changed, and devices completed a desiccant cycle through [employer]. Post-cleaning listening check revealed good function of the devices. Connected devices to fitting software. Further strengthened noise block in automatic programs and soft noise reduction, as well as decreased high frequency gain for all inputs. Gain set to 95% target gain. Datalogging revealed 7.6-8.2 hours of use per day. PATIENT EDUCATION:      - Reviewed programming changes made to day. RECOMMENDATIONS:     Continue consistent hearing aid use. Return for hearing aid checks as needed; an appointment was scheduled for 4/3/2023. Contact Audiology if concerns arise. Recommended earlier appointment for programming, if desired. No charge for today's appointment; patient under service warranty through 9/23/2023.         [facility] [place], [place]  Audiologist

## 2023-04-03 ENCOUNTER — PROCEDURE VISIT (OUTPATIENT)
Dept: AUDIOLOGY | Age: 68
End: 2023-04-03

## 2023-04-03 DIAGNOSIS — H90.3 SENSORINEURAL HEARING LOSS, BILATERAL: Primary | ICD-10-CM

## 2023-04-03 PROCEDURE — 99999 PR OFFICE/OUTPT VISIT,PROCEDURE ONLY: CPT | Performed by: AUDIOLOGIST

## 2023-04-03 NOTE — PROGRESS NOTES
Fran Mixon   1955, 75 y.o. female   [id number]     RIGHT EAR: /MODEL: VidAngel De Leon L50-R  SN: [id number]  WIRE/DOME: 1M/medium open    LEFT EAR: /MODEL: [place] L50-R  SN: [id number]  WIRE/DOME: 1M/medium open    : /MODEL: Phonak  Ease  SN: [id number]   HAF: 9/23/2022  WARRANTY: 12/8/2025     BUTTONS: Short Press (Volume): Up raises, Down lowers  PROGRAMS: [employer]  PHONE CONNECTIVITY: None at [address] was seen today, 4/3/2023, for a hearing aid check appointment, accompanied by her , Hari. Patient noted she does have some difficulty in groups and finds background noise to overwhelm voices. PROCEDURES:     Otoscopy revealed: Clear ear canals bilaterally  Hearing aids were cleaned and checked. A listening check revealed good function of the devices. Microphone and  ports were suctioned, wax traps were changed, and devices completed a desiccant cycle through [employer]. Post-cleaning listening check revealed good function of the devices. Connected devices to fitting software. Increased overall gain to 100%. Decreased soft inputs overall, and strengthened soft noise reduction. Datalogging revealed 7.3 hours of use per day. PATIENT EDUCATION:      - Reviewed programming changes made to day. RECOMMENDATIONS:     Continue consistent hearing aid use. Return for hearing aid checks as needed; an appointment was scheduled for 9/18/2023. Contact Audiology if concerns arise. Recommended earlier appointment for programming, if desired. No charge for today's appointment; patient under service warranty through 9/23/2023.         [facility] [place], [place]  Audiologist

## 2023-08-16 DIAGNOSIS — M81.0 AGE-RELATED OSTEOPOROSIS WITHOUT CURRENT PATHOLOGICAL FRACTURE: ICD-10-CM

## 2023-08-16 RX ORDER — ALENDRONATE SODIUM 70 MG/1
TABLET ORAL
Qty: 12 TABLET | Refills: 0 | Status: SHIPPED | OUTPATIENT
Start: 2023-08-16

## 2023-08-16 NOTE — TELEPHONE ENCOUNTER
Medication:   Requested Prescriptions     Pending Prescriptions Disp Refills    alendronate (FOSAMAX) 70 MG tablet [Pharmacy Med Name: ALENDRONATE SODIUM 70 MG TAB] 12 tablet 3     Sig: TAKE 1 TABLET BY MOUTH ONE TIME PER WEEK        Last Filled:  9/15/22    Last appt: 11/18/2022   Next appt: Visit date not found  Return for Medicare Annual Wellness Visit in 1 year. Last OARRS: No flowsheet data found.

## 2023-08-17 SDOH — HEALTH STABILITY: PHYSICAL HEALTH: ON AVERAGE, HOW MANY MINUTES DO YOU ENGAGE IN EXERCISE AT THIS LEVEL?: 30 MIN

## 2023-08-17 SDOH — HEALTH STABILITY: PHYSICAL HEALTH: ON AVERAGE, HOW MANY DAYS PER WEEK DO YOU ENGAGE IN MODERATE TO STRENUOUS EXERCISE (LIKE A BRISK WALK)?: 7 DAYS

## 2023-08-17 ASSESSMENT — PATIENT HEALTH QUESTIONNAIRE - PHQ9
2. FEELING DOWN, DEPRESSED OR HOPELESS: 0
SUM OF ALL RESPONSES TO PHQ QUESTIONS 1-9: 0
SUM OF ALL RESPONSES TO PHQ QUESTIONS 1-9: 0
1. LITTLE INTEREST OR PLEASURE IN DOING THINGS: 0
SUM OF ALL RESPONSES TO PHQ QUESTIONS 1-9: 0
SUM OF ALL RESPONSES TO PHQ9 QUESTIONS 1 & 2: 0
SUM OF ALL RESPONSES TO PHQ QUESTIONS 1-9: 0

## 2023-08-17 ASSESSMENT — LIFESTYLE VARIABLES
HOW OFTEN DO YOU HAVE A DRINK CONTAINING ALCOHOL: 2
HOW OFTEN DO YOU HAVE SIX OR MORE DRINKS ON ONE OCCASION: 1
HOW MANY STANDARD DRINKS CONTAINING ALCOHOL DO YOU HAVE ON A TYPICAL DAY: 1 OR 2
HOW OFTEN DO YOU HAVE A DRINK CONTAINING ALCOHOL: MONTHLY OR LESS
HOW MANY STANDARD DRINKS CONTAINING ALCOHOL DO YOU HAVE ON A TYPICAL DAY: 1

## 2023-08-19 SDOH — ECONOMIC STABILITY: HOUSING INSECURITY
IN THE LAST 12 MONTHS, WAS THERE A TIME WHEN YOU DID NOT HAVE A STEADY PLACE TO SLEEP OR SLEPT IN A SHELTER (INCLUDING NOW)?: PATIENT REFUSED

## 2023-08-19 SDOH — ECONOMIC STABILITY: TRANSPORTATION INSECURITY
IN THE PAST 12 MONTHS, HAS LACK OF TRANSPORTATION KEPT YOU FROM MEETINGS, WORK, OR FROM GETTING THINGS NEEDED FOR DAILY LIVING?: PATIENT DECLINED

## 2023-08-19 SDOH — ECONOMIC STABILITY: INCOME INSECURITY: HOW HARD IS IT FOR YOU TO PAY FOR THE VERY BASICS LIKE FOOD, HOUSING, MEDICAL CARE, AND HEATING?: PATIENT DECLINED

## 2023-08-19 SDOH — ECONOMIC STABILITY: FOOD INSECURITY: WITHIN THE PAST 12 MONTHS, THE FOOD YOU BOUGHT JUST DIDN'T LAST AND YOU DIDN'T HAVE MONEY TO GET MORE.: PATIENT DECLINED

## 2023-08-19 SDOH — ECONOMIC STABILITY: FOOD INSECURITY: WITHIN THE PAST 12 MONTHS, YOU WORRIED THAT YOUR FOOD WOULD RUN OUT BEFORE YOU GOT MONEY TO BUY MORE.: PATIENT DECLINED

## 2023-08-22 ENCOUNTER — OFFICE VISIT (OUTPATIENT)
Dept: PRIMARY CARE CLINIC | Age: 68
End: 2023-08-22

## 2023-08-22 VITALS
OXYGEN SATURATION: 99 % | BODY MASS INDEX: 28.97 KG/M2 | HEART RATE: 57 BPM | DIASTOLIC BLOOD PRESSURE: 66 MMHG | TEMPERATURE: 98.1 F | WEIGHT: 171.4 LBS | SYSTOLIC BLOOD PRESSURE: 104 MMHG

## 2023-08-22 DIAGNOSIS — Z23 PNEUMOCOCCAL VACCINE ADMINISTERED: ICD-10-CM

## 2023-08-22 DIAGNOSIS — Z00.00 MEDICARE ANNUAL WELLNESS VISIT, SUBSEQUENT: Primary | ICD-10-CM

## 2023-08-22 DIAGNOSIS — E78.00 HYPERCHOLESTEROLEMIA: ICD-10-CM

## 2023-08-22 DIAGNOSIS — N89.8 VAGINAL ITCHING: ICD-10-CM

## 2023-08-22 DIAGNOSIS — G89.29 CHRONIC PAIN OF RIGHT KNEE: ICD-10-CM

## 2023-08-22 DIAGNOSIS — M81.0 AGE-RELATED OSTEOPOROSIS WITHOUT CURRENT PATHOLOGICAL FRACTURE: ICD-10-CM

## 2023-08-22 DIAGNOSIS — M25.561 CHRONIC PAIN OF RIGHT KNEE: ICD-10-CM

## 2023-08-22 RX ORDER — NYSTATIN 100000 U/G
CREAM TOPICAL
Qty: 30 G | Refills: 0 | Status: SHIPPED | OUTPATIENT
Start: 2023-08-22

## 2023-08-22 NOTE — PATIENT INSTRUCTIONS
Personalized Preventive Plan for Jesus Mueller - 8/22/2023  Medicare offers a range of preventive health benefits. Some of the tests and screenings are paid in full while other may be subject to a deductible, co-insurance, and/or copay. Some of these benefits include a comprehensive review of your medical history including lifestyle, illnesses that may run in your family, and various assessments and screenings as appropriate. After reviewing your medical record and screening and assessments performed today your provider may have ordered immunizations, labs, imaging, and/or referrals for you. A list of these orders (if applicable) as well as your Preventive Care list are included within your After Visit Summary for your review. Other Preventive Recommendations:    A preventive eye exam performed by an eye specialist is recommended every 1-2 years to screen for glaucoma; cataracts, macular degeneration, and other eye disorders. A preventive dental visit is recommended every 6 months. Try to get at least 150 minutes of exercise per week or 10,000 steps per day on a pedometer . Order or download the FREE \"Exercise & Physical Activity: Your Everyday Guide\" from [employer]. Call 7-284.861.8306 or search [employer] online. You need 4473-8660 mg of calcium and 6092-1186 IU of vitamin D per day. It is possible to meet your calcium requirement with diet alone, but a vitamin D supplement is usually necessary to meet this goal.  When exposed to the sun, use a sunscreen that protects against both UVA and UVB radiation with an SPF of 30 or greater. Reapply every 2 to 3 hours or after sweating, drying off with a towel, or swimming. Always wear a seat belt when traveling in a car. Always wear a helmet when riding a bicycle or motorcycle.

## 2023-08-23 ENCOUNTER — HOSPITAL ENCOUNTER (OUTPATIENT)
Dept: GENERAL RADIOLOGY | Age: 68
Discharge: HOME | End: 2023-08-23
Payer: MEDICARE

## 2023-08-23 DIAGNOSIS — E78.00 HYPERCHOLESTEROLEMIA: ICD-10-CM

## 2023-08-23 DIAGNOSIS — G89.29 CHRONIC PAIN OF RIGHT KNEE: ICD-10-CM

## 2023-08-23 DIAGNOSIS — M25.561 CHRONIC PAIN OF RIGHT KNEE: ICD-10-CM

## 2023-08-23 LAB
ALBUMIN SERPL-MCNC: 4.5 G/DL (ref 3.4–5)
ALBUMIN/GLOB SERPL: 2.3 {RATIO} (ref 1.1–2.2)
ALP SERPL-CCNC: 64 U/L (ref 40–129)
ALT SERPL-CCNC: 12 U/L (ref 10–40)
ANION GAP SERPL CALCULATED.3IONS-SCNC: 8 MMOL/L (ref 3–16)
AST SERPL-CCNC: 14 U/L (ref 15–37)
BASOPHILS # BLD: 0 K/UL (ref 0–0.2)
BASOPHILS NFR BLD: 0.6 %
BILIRUB SERPL-MCNC: 0.5 MG/DL (ref 0–1)
BUN SERPL-MCNC: 14 MG/DL (ref 7–20)
CALCIUM SERPL-MCNC: 9.2 MG/DL (ref 8.3–10.6)
CHLORIDE SERPL-SCNC: 105 MMOL/L (ref 99–110)
CHOLEST SERPL-MCNC: 227 MG/DL (ref 0–199)
CO2 SERPL-SCNC: 29 MMOL/L (ref 21–32)
CREAT SERPL-MCNC: 0.7 MG/DL (ref 0.6–1.2)
DEPRECATED RDW RBC AUTO: 13.7 % (ref 12.4–15.4)
EOSINOPHIL # BLD: 0.2 K/UL (ref 0–0.6)
EOSINOPHIL NFR BLD: 2.4 %
GFR SERPLBLD CREATININE-BSD FMLA CKD-EPI: >60 ML/MIN/{1.73_M2}
GLUCOSE SERPL-MCNC: 85 MG/DL (ref 70–99)
HCT VFR BLD AUTO: 40 % (ref 36–48)
HDLC SERPL-MCNC: 47 MG/DL (ref 40–60)
HGB BLD-MCNC: 13.6 G/DL (ref 12–16)
LDL CHOLESTEROL CALCULATED: 148 MG/DL
LYMPHOCYTES # BLD: 1.4 K/UL (ref 1–5.1)
LYMPHOCYTES NFR BLD: 22.2 %
MCH RBC QN AUTO: 31.7 PG (ref 26–34)
MCHC RBC AUTO-ENTMCNC: 33.9 G/DL (ref 31–36)
MCV RBC AUTO: 93.3 FL (ref 80–100)
MONOCYTES # BLD: 0.4 K/UL (ref 0–1.3)
MONOCYTES NFR BLD: 6.3 %
NEUTROPHILS # BLD: 4.5 K/UL (ref 1.7–7.7)
NEUTROPHILS NFR BLD: 68.5 %
PLATELET # BLD AUTO: 185 K/UL (ref 135–450)
PMV BLD AUTO: 9.5 FL (ref 5–10.5)
POTASSIUM SERPL-SCNC: 4.7 MMOL/L (ref 3.5–5.1)
PROT SERPL-MCNC: 6.5 G/DL (ref 6.4–8.2)
RBC # BLD AUTO: 4.29 M/UL (ref 4–5.2)
SODIUM SERPL-SCNC: 142 MMOL/L (ref 136–145)
TRIGL SERPL-MCNC: 158 MG/DL (ref 0–150)
VLDLC SERPL CALC-MCNC: 32 MG/DL
WBC # BLD AUTO: 6.5 K/UL (ref 4–11)

## 2023-08-23 PROCEDURE — 73562 X-RAY EXAM OF KNEE 3: CPT

## 2023-09-07 ENCOUNTER — HOSPITAL ENCOUNTER (OUTPATIENT)
Dept: WOMENS IMAGING | Age: 68
Discharge: HOME | End: 2023-09-07
Payer: MEDICARE

## 2023-09-07 DIAGNOSIS — M81.0 AGE-RELATED OSTEOPOROSIS WITHOUT CURRENT PATHOLOGICAL FRACTURE: ICD-10-CM

## 2023-09-07 PROCEDURE — 77080 DXA BONE DENSITY AXIAL: CPT

## 2023-09-15 ENCOUNTER — PROCEDURE VISIT (OUTPATIENT)
Dept: AUDIOLOGY | Age: 68
End: 2023-09-15

## 2023-09-15 DIAGNOSIS — H90.3 SENSORINEURAL HEARING LOSS, BILATERAL: Primary | ICD-10-CM

## 2023-09-15 NOTE — PROGRESS NOTES
Di Mcgrathsierra   1955, 70 y.o. female   [id number]     RIGHT EAR: /MODEL: William Smith L50-R  SN: [id number]  WIRE/DOME: 1M/medium open    LEFT EAR: /MODEL: William Clos L50-R  SN: [id number]  WIRE/DOME: 1M/medium open    : /MODEL: Phonak  Ease  SN: [id number]   HAF: 9/23/2022  WARRANTY: 12/8/2025     BUTTONS: Short Press (Volume): Up raises, Down lowers  PROGRAMS: [employer]  PHONE CONNECTIVITY: None at [address] was seen today, 9/15/2023, for a hearing aid check appointment, accompanied by her , SHASHANK. Patient noted she does have some difficulty in groups/noise. She also raises the volume and times and notices an echo. PROCEDURES:     Otoscopy revealed: Clear ear canals bilaterally  Hearing aids were cleaned and checked. A listening check revealed good function of the devices. Microphone and  ports were suctioned, wax traps were changed, and devices completed a desiccant cycle through [employer]. Post-cleaning listening check revealed good function of the devices. Connected devices to fitting software. Decreased G80 and G65 overall, then strengthened 9359-1646 Hz for G50 and G65. She noted improved sound quality in her voice. Also completed firmware update. Datalogging revealed 8.3-8.5 hours of use per day. PATIENT EDUCATION:      - Reviewed programming changes made to day. RECOMMENDATIONS:     Continue consistent hearing aid use. Return for hearing aid checks as needed; an appointment was scheduled for 9/20/2024. Contact Audiology if concerns arise. Recommended earlier appointment for programming, if desired. No charge for today's appointment; patient is aware that future appointments are at a charge.         [facility]  Audiologist

## 2023-11-05 DIAGNOSIS — M81.0 AGE-RELATED OSTEOPOROSIS WITHOUT CURRENT PATHOLOGICAL FRACTURE: ICD-10-CM

## 2023-11-06 RX ORDER — ALENDRONATE SODIUM 70 MG/1
TABLET ORAL
Qty: 12 TABLET | Refills: 2 | Status: SHIPPED | OUTPATIENT
Start: 2023-11-06

## 2024-01-08 ENCOUNTER — HOSPITAL ENCOUNTER (OUTPATIENT)
Dept: WOMENS IMAGING | Age: 69
Discharge: HOME | End: 2024-01-08
Payer: MEDICARE

## 2024-01-08 VITALS — HEIGHT: 65 IN | WEIGHT: 168 LBS | BODY MASS INDEX: 27.99 KG/M2

## 2024-01-08 DIAGNOSIS — Z12.31 SCREENING MAMMOGRAM FOR BREAST CANCER: ICD-10-CM

## 2024-01-08 PROCEDURE — 77063 BREAST TOMOSYNTHESIS BI: CPT

## 2024-09-02 SDOH — HEALTH STABILITY: PHYSICAL HEALTH: ON AVERAGE, HOW MANY MINUTES DO YOU ENGAGE IN EXERCISE AT THIS LEVEL?: 60 MIN

## 2024-09-02 SDOH — ECONOMIC STABILITY: FOOD INSECURITY: WITHIN THE PAST 12 MONTHS, YOU WORRIED THAT YOUR FOOD WOULD RUN OUT BEFORE YOU GOT MONEY TO BUY MORE.: PATIENT DECLINED

## 2024-09-02 SDOH — HEALTH STABILITY: PHYSICAL HEALTH: ON AVERAGE, HOW MANY DAYS PER WEEK DO YOU ENGAGE IN MODERATE TO STRENUOUS EXERCISE (LIKE A BRISK WALK)?: 7 DAYS

## 2024-09-02 SDOH — ECONOMIC STABILITY: INCOME INSECURITY: HOW HARD IS IT FOR YOU TO PAY FOR THE VERY BASICS LIKE FOOD, HOUSING, MEDICAL CARE, AND HEATING?: PATIENT DECLINED

## 2024-09-02 SDOH — ECONOMIC STABILITY: FOOD INSECURITY: WITHIN THE PAST 12 MONTHS, THE FOOD YOU BOUGHT JUST DIDN'T LAST AND YOU DIDN'T HAVE MONEY TO GET MORE.: PATIENT DECLINED

## 2024-09-02 ASSESSMENT — PATIENT HEALTH QUESTIONNAIRE - PHQ9
SUM OF ALL RESPONSES TO PHQ QUESTIONS 1-9: 0
SUM OF ALL RESPONSES TO PHQ9 QUESTIONS 1 & 2: 0
1. LITTLE INTEREST OR PLEASURE IN DOING THINGS: NOT AT ALL
2. FEELING DOWN, DEPRESSED OR HOPELESS: NOT AT ALL

## 2024-09-02 ASSESSMENT — LIFESTYLE VARIABLES
HOW MANY STANDARD DRINKS CONTAINING ALCOHOL DO YOU HAVE ON A TYPICAL DAY: 1
HOW OFTEN DO YOU HAVE A DRINK CONTAINING ALCOHOL: MONTHLY OR LESS
HOW OFTEN DO YOU HAVE SIX OR MORE DRINKS ON ONE OCCASION: 1
HOW MANY STANDARD DRINKS CONTAINING ALCOHOL DO YOU HAVE ON A TYPICAL DAY: 1 OR 2
HOW OFTEN DO YOU HAVE A DRINK CONTAINING ALCOHOL: 2

## 2024-09-03 ENCOUNTER — OFFICE VISIT (OUTPATIENT)
Dept: PRIMARY CARE CLINIC | Age: 69
End: 2024-09-03

## 2024-09-03 VITALS
DIASTOLIC BLOOD PRESSURE: 66 MMHG | HEIGHT: 65 IN | WEIGHT: 167.4 LBS | OXYGEN SATURATION: 99 % | BODY MASS INDEX: 27.89 KG/M2 | HEART RATE: 60 BPM | SYSTOLIC BLOOD PRESSURE: 108 MMHG

## 2024-09-03 DIAGNOSIS — G89.29 CHRONIC PAIN OF RIGHT KNEE: ICD-10-CM

## 2024-09-03 DIAGNOSIS — Z00.00 MEDICARE ANNUAL WELLNESS VISIT, SUBSEQUENT: Primary | ICD-10-CM

## 2024-09-03 DIAGNOSIS — E78.00 HYPERCHOLESTEROLEMIA: ICD-10-CM

## 2024-09-03 DIAGNOSIS — M25.561 CHRONIC PAIN OF RIGHT KNEE: ICD-10-CM

## 2024-09-03 PROBLEM — M54.50 CHRONIC RIGHT-SIDED LOW BACK PAIN WITHOUT SCIATICA: Status: RESOLVED | Noted: 2017-04-18 | Resolved: 2024-09-03

## 2024-09-03 RX ORDER — CELECOXIB 100 MG/1
100 CAPSULE ORAL 2 TIMES DAILY
Qty: 60 CAPSULE | Refills: 0 | Status: SHIPPED | OUTPATIENT
Start: 2024-09-03

## 2024-09-03 NOTE — PROGRESS NOTES
masses or organomegaly  Extremities: no edema            Allergies   Allergen Reactions    Codeine Itching     Itching     Prior to Visit Medications    Medication Sig Taking? Authorizing Provider   celecoxib (CELEBREX) 100 MG capsule Take 1 capsule by mouth 2 times daily Yes Ju Patel DO       CareTeam (Including outside providers/suppliers regularly involved in providing care):   Patient Care Team:  Ju Patel DO as PCP - General (Family Medicine)  Wolf Granado MD as PCP - OBGYN (Obstetrics & Gynecology)  Ju Patel DO as PCP - Empaneled Provider      Reviewed and updated this visit:  Tobacco  Allergies  Meds  Med Hx  Surg Hx  Soc Hx  Fam Hx

## 2024-09-03 NOTE — PATIENT INSTRUCTIONS
Personalized Preventive Plan for Effie Goldsmith - 9/3/2024  Medicare offers a range of preventive health benefits. Some of the tests and screenings are paid in full while other may be subject to a deductible, co-insurance, and/or copay.    Some of these benefits include a comprehensive review of your medical history including lifestyle, illnesses that may run in your family, and various assessments and screenings as appropriate.    After reviewing your medical record and screening and assessments performed today your provider may have ordered immunizations, labs, imaging, and/or referrals for you.  A list of these orders (if applicable) as well as your Preventive Care list are included within your After Visit Summary for your review.    Other Preventive Recommendations:    A preventive eye exam performed by an eye specialist is recommended every 1-2 years to screen for glaucoma; cataracts, macular degeneration, and other eye disorders.  A preventive dental visit is recommended every 6 months.  Try to get at least 150 minutes of exercise per week or 10,000 steps per day on a pedometer .  Order or download the FREE \"Exercise & Physical Activity: Your Everyday Guide\" from [employer]. Call 1-595.670.5672 or search [employer] online.  You need 3235-7197 mg of calcium and 1248-4753 IU of vitamin D per day. It is possible to meet your calcium requirement with diet alone, but a vitamin D supplement is usually necessary to meet this goal.  When exposed to the sun, use a sunscreen that protects against both UVA and UVB radiation with an SPF of 30 or greater. Reapply every 2 to 3 hours or after sweating, drying off with a towel, or swimming.  Always wear a seat belt when traveling in a car. Always wear a helmet when riding a bicycle or motorcycle.

## 2024-09-04 DIAGNOSIS — E78.00 HYPERCHOLESTEROLEMIA: ICD-10-CM

## 2024-09-05 LAB
ALBUMIN SERPL-MCNC: 4.3 G/DL (ref 3.4–5)
ALBUMIN/GLOB SERPL: 2.2 {RATIO} (ref 1.1–2.2)
ALP SERPL-CCNC: 64 U/L (ref 40–129)
ALT SERPL-CCNC: 13 U/L (ref 10–40)
ANION GAP SERPL CALCULATED.3IONS-SCNC: 9 MMOL/L (ref 3–16)
AST SERPL-CCNC: 18 U/L (ref 15–37)
BILIRUB SERPL-MCNC: 0.4 MG/DL (ref 0–1)
BUN SERPL-MCNC: 15 MG/DL (ref 7–20)
CALCIUM SERPL-MCNC: 9.4 MG/DL (ref 8.3–10.6)
CHLORIDE SERPL-SCNC: 105 MMOL/L (ref 99–110)
CHOLEST SERPL-MCNC: 215 MG/DL (ref 0–199)
CO2 SERPL-SCNC: 27 MMOL/L (ref 21–32)
CREAT SERPL-MCNC: 0.8 MG/DL (ref 0.6–1.2)
GFR SERPLBLD CREATININE-BSD FMLA CKD-EPI: 79 ML/MIN/{1.73_M2}
GLUCOSE SERPL-MCNC: 75 MG/DL (ref 70–99)
HDLC SERPL-MCNC: 43 MG/DL (ref 40–60)
LDL CHOLESTEROL: 149 MG/DL
POTASSIUM SERPL-SCNC: 4.4 MMOL/L (ref 3.5–5.1)
PROT SERPL-MCNC: 6.3 G/DL (ref 6.4–8.2)
SODIUM SERPL-SCNC: 141 MMOL/L (ref 136–145)
TRIGL SERPL-MCNC: 114 MG/DL (ref 0–150)
VLDLC SERPL CALC-MCNC: 23 MG/DL

## 2024-09-06 DIAGNOSIS — Z91.89 AT HIGH RISK FOR CARDIOVASCULAR DISEASE: ICD-10-CM

## 2024-09-06 DIAGNOSIS — E78.00 HYPERCHOLESTEROLEMIA: Primary | ICD-10-CM

## 2024-09-13 ENCOUNTER — HOSPITAL ENCOUNTER (OUTPATIENT)
Dept: CT IMAGING | Age: 69
Discharge: HOME | End: 2024-09-13
Payer: MEDICARE

## 2024-09-13 DIAGNOSIS — E78.00 HYPERCHOLESTEROLEMIA: ICD-10-CM

## 2024-09-13 DIAGNOSIS — Z91.89 AT HIGH RISK FOR CARDIOVASCULAR DISEASE: ICD-10-CM

## 2024-09-13 PROCEDURE — 75571 CT HRT W/O DYE W/CA TEST: CPT

## 2024-09-19 ENCOUNTER — PROCEDURE VISIT (OUTPATIENT)
Dept: AUDIOLOGY | Age: 69
End: 2024-09-19

## 2024-09-19 DIAGNOSIS — H90.3 SENSORINEURAL HEARING LOSS, BILATERAL: Primary | ICD-10-CM

## 2024-09-19 PROCEDURE — 92593 PR HEARING AID CHECK BINAURAL: CPT | Performed by: AUDIOLOGIST

## 2025-02-12 ENCOUNTER — HOSPITAL ENCOUNTER (OUTPATIENT)
Dept: WOMENS IMAGING | Age: 70
Discharge: HOME | End: 2025-02-12
Payer: MEDICARE

## 2025-02-12 VITALS — HEIGHT: 64 IN | WEIGHT: 167 LBS | BODY MASS INDEX: 28.51 KG/M2

## 2025-02-12 DIAGNOSIS — Z12.31 OTHER SCREENING MAMMOGRAM: ICD-10-CM

## 2025-02-12 PROCEDURE — 77063 BREAST TOMOSYNTHESIS BI: CPT
